# Patient Record
Sex: MALE | Race: WHITE | Employment: FULL TIME | URBAN - METROPOLITAN AREA
[De-identification: names, ages, dates, MRNs, and addresses within clinical notes are randomized per-mention and may not be internally consistent; named-entity substitution may affect disease eponyms.]

---

## 2019-01-08 ENCOUNTER — OFFICE VISIT (OUTPATIENT)
Dept: INTERNAL MEDICINE | Facility: CLINIC | Age: 54
End: 2019-01-08
Payer: COMMERCIAL

## 2019-01-08 VITALS
OXYGEN SATURATION: 100 % | WEIGHT: 195 LBS | BODY MASS INDEX: 27.3 KG/M2 | HEART RATE: 96 BPM | RESPIRATION RATE: 18 BRPM | TEMPERATURE: 98.1 F | SYSTOLIC BLOOD PRESSURE: 130 MMHG | HEIGHT: 71 IN | DIASTOLIC BLOOD PRESSURE: 68 MMHG

## 2019-01-08 DIAGNOSIS — Z00.00 ROUTINE GENERAL MEDICAL EXAMINATION AT A HEALTH CARE FACILITY: Primary | ICD-10-CM

## 2019-01-08 DIAGNOSIS — Z12.5 SCREENING PSA (PROSTATE SPECIFIC ANTIGEN): ICD-10-CM

## 2019-01-08 DIAGNOSIS — E78.5 HYPERLIPIDEMIA LDL GOAL <130: ICD-10-CM

## 2019-01-08 LAB
ALBUMIN SERPL-MCNC: 4.2 G/DL (ref 3.4–5)
ALP SERPL-CCNC: 72 U/L (ref 40–150)
ALT SERPL W P-5'-P-CCNC: 34 U/L (ref 0–70)
ANION GAP SERPL CALCULATED.3IONS-SCNC: 8 MMOL/L (ref 3–14)
AST SERPL W P-5'-P-CCNC: 21 U/L (ref 0–45)
BILIRUB SERPL-MCNC: 1.4 MG/DL (ref 0.2–1.3)
BUN SERPL-MCNC: 15 MG/DL (ref 7–30)
CALCIUM SERPL-MCNC: 9.1 MG/DL (ref 8.5–10.1)
CHLORIDE SERPL-SCNC: 105 MMOL/L (ref 94–109)
CHOLEST SERPL-MCNC: 257 MG/DL
CO2 SERPL-SCNC: 27 MMOL/L (ref 20–32)
CREAT SERPL-MCNC: 1.14 MG/DL (ref 0.66–1.25)
ERYTHROCYTE [DISTWIDTH] IN BLOOD BY AUTOMATED COUNT: 13.9 % (ref 10–15)
GFR SERPL CREATININE-BSD FRML MDRD: 73 ML/MIN/{1.73_M2}
GLUCOSE SERPL-MCNC: 97 MG/DL (ref 70–99)
HCT VFR BLD AUTO: 43.9 % (ref 40–53)
HDLC SERPL-MCNC: 48 MG/DL
HGB BLD-MCNC: 14.4 G/DL (ref 13.3–17.7)
LDLC SERPL CALC-MCNC: 186 MG/DL
MCH RBC QN AUTO: 28.9 PG (ref 26.5–33)
MCHC RBC AUTO-ENTMCNC: 32.8 G/DL (ref 31.5–36.5)
MCV RBC AUTO: 88 FL (ref 78–100)
NONHDLC SERPL-MCNC: 209 MG/DL
PLATELET # BLD AUTO: 231 10E9/L (ref 150–450)
POTASSIUM SERPL-SCNC: 3.9 MMOL/L (ref 3.4–5.3)
PROT SERPL-MCNC: 7.5 G/DL (ref 6.8–8.8)
PSA SERPL-ACNC: 0.99 UG/L (ref 0–4)
RBC # BLD AUTO: 4.99 10E12/L (ref 4.4–5.9)
SODIUM SERPL-SCNC: 140 MMOL/L (ref 133–144)
TRIGL SERPL-MCNC: 116 MG/DL
TSH SERPL DL<=0.005 MIU/L-ACNC: 2.11 MU/L (ref 0.4–4)
WBC # BLD AUTO: 6 10E9/L (ref 4–11)

## 2019-01-08 PROCEDURE — 99396 PREV VISIT EST AGE 40-64: CPT | Performed by: INTERNAL MEDICINE

## 2019-01-08 PROCEDURE — 85027 COMPLETE CBC AUTOMATED: CPT | Performed by: INTERNAL MEDICINE

## 2019-01-08 PROCEDURE — 80053 COMPREHEN METABOLIC PANEL: CPT | Performed by: INTERNAL MEDICINE

## 2019-01-08 PROCEDURE — G0103 PSA SCREENING: HCPCS | Performed by: INTERNAL MEDICINE

## 2019-01-08 PROCEDURE — 84443 ASSAY THYROID STIM HORMONE: CPT | Performed by: INTERNAL MEDICINE

## 2019-01-08 PROCEDURE — 80061 LIPID PANEL: CPT | Performed by: INTERNAL MEDICINE

## 2019-01-08 PROCEDURE — 36415 COLL VENOUS BLD VENIPUNCTURE: CPT | Performed by: INTERNAL MEDICINE

## 2019-01-08 SDOH — HEALTH STABILITY: MENTAL HEALTH
STRESS IS WHEN SOMEONE FEELS TENSE, NERVOUS, ANXIOUS, OR CAN'T SLEEP AT NIGHT BECAUSE THEIR MIND IS TROUBLED. HOW STRESSED ARE YOU?: NOT AT ALL

## 2019-01-08 SDOH — ECONOMIC STABILITY: INCOME INSECURITY: HOW HARD IS IT FOR YOU TO PAY FOR THE VERY BASICS LIKE FOOD, HOUSING, MEDICAL CARE, AND HEATING?: NOT HARD AT ALL

## 2019-01-08 SDOH — ECONOMIC STABILITY: FOOD INSECURITY: WITHIN THE PAST 12 MONTHS, THE FOOD YOU BOUGHT JUST DIDN'T LAST AND YOU DIDN'T HAVE MONEY TO GET MORE.: NEVER TRUE

## 2019-01-08 SDOH — SOCIAL STABILITY: SOCIAL NETWORK: ARE YOU MARRIED, WIDOWED, DIVORCED, SEPARATED, NEVER MARRIED, OR LIVING WITH A PARTNER?: MARRIED

## 2019-01-08 SDOH — ECONOMIC STABILITY: TRANSPORTATION INSECURITY
IN THE PAST 12 MONTHS, HAS LACK OF TRANSPORTATION KEPT YOU FROM MEETINGS, WORK, OR FROM GETTING THINGS NEEDED FOR DAILY LIVING?: NO

## 2019-01-08 SDOH — HEALTH STABILITY: PHYSICAL HEALTH: ON AVERAGE, HOW MANY DAYS PER WEEK DO YOU ENGAGE IN MODERATE TO STRENUOUS EXERCISE (LIKE A BRISK WALK)?: 5 DAYS

## 2019-01-08 SDOH — ECONOMIC STABILITY: FOOD INSECURITY: WITHIN THE PAST 12 MONTHS, YOU WORRIED THAT YOUR FOOD WOULD RUN OUT BEFORE YOU GOT MONEY TO BUY MORE.: NEVER TRUE

## 2019-01-08 SDOH — HEALTH STABILITY: MENTAL HEALTH: HOW OFTEN DO YOU HAVE 6 OR MORE DRINKS ON ONE OCCASION?: NEVER

## 2019-01-08 SDOH — ECONOMIC STABILITY: TRANSPORTATION INSECURITY
IN THE PAST 12 MONTHS, HAS THE LACK OF TRANSPORTATION KEPT YOU FROM MEDICAL APPOINTMENTS OR FROM GETTING MEDICATIONS?: NO

## 2019-01-08 SDOH — SOCIAL STABILITY: SOCIAL INSECURITY: WITHIN THE LAST YEAR, HAVE YOU BEEN AFRAID OF YOUR PARTNER OR EX-PARTNER?: NO

## 2019-01-08 SDOH — SOCIAL STABILITY: SOCIAL INSECURITY
WITHIN THE LAST YEAR, HAVE YOU BEEN KICKED, HIT, SLAPPED, OR OTHERWISE PHYSICALLY HURT BY YOUR PARTNER OR EX-PARTNER?: NO

## 2019-01-08 SDOH — SOCIAL STABILITY: SOCIAL INSECURITY
WITHIN THE LAST YEAR, HAVE TO BEEN RAPED OR FORCED TO HAVE ANY KIND OF SEXUAL ACTIVITY BY YOUR PARTNER OR EX-PARTNER?: NO

## 2019-01-08 SDOH — HEALTH STABILITY: MENTAL HEALTH: HOW OFTEN DO YOU HAVE A DRINK CONTAINING ALCOHOL?: 2-4 TIMES A MONTH

## 2019-01-08 SDOH — SOCIAL STABILITY: SOCIAL INSECURITY: WITHIN THE LAST YEAR, HAVE YOU BEEN HUMILIATED OR EMOTIONALLY ABUSED IN OTHER WAYS BY YOUR PARTNER OR EX-PARTNER?: NO

## 2019-01-08 SDOH — HEALTH STABILITY: PHYSICAL HEALTH: ON AVERAGE, HOW MANY MINUTES DO YOU ENGAGE IN EXERCISE AT THIS LEVEL?: 60 MIN

## 2019-01-08 SDOH — HEALTH STABILITY: MENTAL HEALTH: HOW MANY STANDARD DRINKS CONTAINING ALCOHOL DO YOU HAVE ON A TYPICAL DAY?: 1 OR 2

## 2019-01-08 ASSESSMENT — ENCOUNTER SYMPTOMS
SHORTNESS OF BREATH: 0
HEADACHES: 0
COUGH: 0
FREQUENCY: 0
NAUSEA: 0
DYSURIA: 0
CHILLS: 0
DIZZINESS: 0
HEMATURIA: 0
ARTHRALGIAS: 0
CONSTIPATION: 0
WEAKNESS: 0
FEVER: 0
PARESTHESIAS: 0
PALPITATIONS: 0
MYALGIAS: 0
SORE THROAT: 0
EYE PAIN: 0
ABDOMINAL PAIN: 0
JOINT SWELLING: 0
NERVOUS/ANXIOUS: 0
DIARRHEA: 0
HEARTBURN: 0
HEMATOCHEZIA: 0

## 2019-01-08 ASSESSMENT — MIFFLIN-ST. JEOR: SCORE: 1745.76

## 2019-01-08 NOTE — PROGRESS NOTES
SUBJECTIVE:   CC: Kory Ambrose is an 53 year old male who presents for preventive health visit.     Healthy Habits:  Answers for HPI/ROS submitted by the patient on 1/8/2019   Annual Exam:  Frequency of exercise:: 4-5 days/week  Getting at least 3 servings of Calcium per day:: Yes  Diet:: Regular (no restrictions)  Taking medications regularly:: Yes  Medication side effects:: None  Bi-annual eye exam:: Yes  Dental care twice a year:: Yes  Sleep apnea or symptoms of sleep apnea:: None  Negative for the following: abdominal pain, Blood in stool, Blood in urine, chest pain, chills, congestion, constipation, cough, diarrhea, dizziness, ear pain, eye pain, nervous/anxious, fever, frequency, genital sores, headaches, hearing loss, heartburn, arthralgias, joint swelling, peripheral edema, mood changes, myalgias, nausea, dysuria, palpitations, Skin sensation changes, sore throat, urgency, rash, shortness of breath, visual disturbance, weakness  impotence: No  penile discharge: No  Additional concerns today:: No  Duration of exercise:: 30-45 minutes    Weight Loss   Patient notes that he intestinally lost weight by having a regular exercise routine and doing portion control. Mentions that he works out five days a week at the [employer] gym. Reports that he participated in the meta fast diet five years ago.     Wt Readings from Last 2 Encounters:   01/08/19 88.5 kg (195 lb)   10/12/16 88.9 kg (196 lb)     HDL  Recent Labs   Lab Test 08/11/16  0733 08/19/15  0930 07/11/14  0848   CHOL 143 120 136   HDL 50 51 42   LDL 78 57 84   TRIG 77 58 45   CHOLHDLRATIO  --  2.4 3.2     Pt discontinued his Simvastatin medication for about a year now.     Past/recent records reviewed and discussed for:  -Reviewed and updated medical hx, social hx, and family hx,  - A family history of cancer, notes that his sister passed away from ovarian cancer.  - Pt had a colonoscopy screening on 12/02/2015    Today's PHQ-2 Score:   PHQ-2 ( 1999 [employer])  1/8/2019 8/11/2016   Q1: Little interest or pleasure in doing things 0 0   Q2: Feeling down, depressed or hopeless 0 0   PHQ-2 Score 0 0   Q1: Little interest or pleasure in doing things Not at all -   Q2: Feeling down, depressed or hopeless Not at all -   PHQ-2 Score 0 -       Abuse: Current or Past(Physical, Sexual or Emotional)- No  Do you feel safe in your environment? Yes    Social History     Tobacco Use     Smoking status: Never Smoker     Smokeless tobacco: Never Used   Substance Use Topics     Alcohol use: Yes     Alcohol/week: 0.0 oz     Frequency: 2-4 times a month     Drinks per session: 1 or 2     Binge frequency: Never     Comment: One beer per week, occasional glass of wine     If you drink alcohol do you typically have >3 drinks per day or >7 drinks per week? No                      Last PSA:   PSA   Date Value Ref Range Status   08/11/2016 0.73 0 - 4 ug/L Final     Comment:     Assay Method:  Chemiluminescence using Siemens Vista analyzer       Reviewed orders with patient. Reviewed health maintenance and updated orders accordingly - Yes      Reviewed and updated as needed this visit by clinical staff  Tobacco  Allergies  Meds  Med Hx  Surg Hx  Fam Hx  Soc Hx        Reviewed and updated as needed this visit by Provider            ROS:  CONSTITUTIONAL: NEGATIVE for fever, chills, change in weight  INTEGUMENTARY/SKIN: NEGATIVE for worrisome rashes, moles or lesions  EYES: NEGATIVE for vision changes or irritation  ENT: NEGATIVE for ear, mouth and throat problems  RESP: NEGATIVE for significant cough or SOB  CV: NEGATIVE for chest pain, palpitations or peripheral edema  GI: NEGATIVE for nausea, abdominal pain, heartburn, or change in bowel habits   male: negative for dysuria, hematuria, decreased urinary stream, erectile dysfunction, urethral discharge  MUSCULOSKELETAL: NEGATIVE for significant arthralgias or myalgia  NEURO: NEGATIVE for weakness, dizziness or paresthesias  PSYCHIATRIC:  "NEGATIVE for changes in mood or affect    This document serves as a record of the services and decisions personally performed and made by Isaac Mallory MD. It was created on his behalf by Shannan Baliey, a trained medical scribe. The creation of this document is based on the provider's statements to the medical scribe.  Shannan Bailey January 8, 2019 7:12 AM      OBJECTIVE:   /68 (BP Location: Left arm, Patient Position: Sitting, Cuff Size: Adult Large)   Pulse 96   Temp 98.1  F (36.7  C) (Oral)   Resp 18   Ht 1.794 m (5' 10.63\")   Wt 88.5 kg (195 lb)   SpO2 100%   BMI 27.48 kg/m     EXAM:  GENERAL: healthy, alert and no distress  EYES: Eyes grossly normal to inspection, PERRL and conjunctivae and sclerae normal  HENT: ear canals and TM's normal, nose and mouth without ulcers or lesions  NECK: no adenopathy, no asymmetry, masses, or scars and thyroid normal to palpation  RESP: lungs clear to auscultation - no rales, rhonchi or wheezes  CV: regular rate and rhythm, normal S1 S2, no S3 or S4, no murmur, click or rub, no peripheral edema and peripheral pulses strong  ABDOMEN: soft, nontender, no hepatosplenomegaly, no masses and bowel sounds normal   (male): normal male genitalia without lesions or urethral discharge, no hernia  RECTAL: normal sphincter tone, no rectal masses, prostate normal size, smooth, nontender without nodules or masses  MS: no gross musculoskeletal defects noted, no edema  SKIN: no suspicious lesions or rashes  NEURO: Normal strength and tone, mentation intact and speech normal  PSYCH: mentation appears normal, affect normal/bright    Diagnostic Test Results:  No results found for this or any previous visit (from the past 24 hour(s)).    ASSESSMENT/PLAN:   (Z00.00) Routine general medical examination at a health care facility  (primary encounter diagnosis)  Comment: Stable health. See epic orders.  Plan: Comprehensive metabolic panel, Lipid panel         reflex to direct LDL Fasting, TSH " "with free T4         reflex, CBC with platelets, Prostate spec         antigen screen            (Z12.5) Screening PSA (prostate specific antigen)  Comment: Labs pending.  Plan: Prostate spec antigen screen            (E78.5) Hyperlipidemia LDL goal <130  Comment: Labs pending.  Plan: Comprehensive metabolic panel, Lipid panel         reflex to direct LDL Fasting          Everything looks fine!    I'll get back to you with lab results soon, especially if there is anything of concern.      See you in a year, sooner if problems.      COUNSELING:  Reviewed preventive health counseling, as reflected in patient instructions       Regular exercise       Healthy diet/nutrition       Vision screening       Colon cancer screening       Prostate cancer screening    BP Readings from Last 1 Encounters:   01/08/19 130/68     Estimated body mass index is 27.48 kg/m  as calculated from the following:    Height as of this encounter: 1.794 m (5' 10.63\").    Weight as of this encounter: 88.5 kg (195 lb).    BP Screening:   Last 3 BP Readings:    BP Readings from Last 3 Encounters:   01/08/19 130/68   10/12/16 121/78   10/05/16 118/68       The following was recommended to the patient:  Re-screen BP within a year and recommended lifestyle modifications  Weight management plan: Discussed healthy diet and exercise guidelines     reports that  has never smoked. he has never used smokeless tobacco.      Counseling Resources:  ATP IV Guidelines  Pooled Cohorts Equation Calculator  FRAX Risk Assessment  ICSI Preventive Guidelines  Dietary Guidelines for Americans, 2010  USDA's MyPlate  ASA Prophylaxis  Lung CA Screening    The information in this document, created by the medical scribe for me, accurately reflects the services I personally performed and the decisions made by me. I have reviewed and approved this document for accuracy.   January 8, 2019 8:03 AM     Isaac Mallory MD, MD  [facility]  "

## 2019-01-08 NOTE — PATIENT INSTRUCTIONS
Preventive Health Recommendations  Male Ages 50 - 64    Yearly exam:             See your health care provider every year in order to  o   Review health changes.   o   Discuss preventive care.    o   Review your medicines if your doctor has prescribed any.     Have a cholesterol test every 5 years, or more frequently if you are at risk for high cholesterol/heart disease.     Have a diabetes test (fasting glucose) every three years. If you are at risk for diabetes, you should have this test more often.     Have a colonoscopy at age 50, or have a yearly FIT test (stool test). These exams will check for colon cancer.      Talk with your health care provider about whether or not a prostate cancer screening test (PSA) is right for you.    You should be tested each year for STDs (sexually transmitted diseases), if you re at risk.     Shots: Get a flu shot each year. Get a tetanus shot every 10 years.     Nutrition:    Eat at least 5 servings of fruits and vegetables daily.     Eat whole-grain bread, whole-wheat pasta and brown rice instead of white grains and rice.     Get adequate Calcium and Vitamin D.     Lifestyle    Exercise for at least 150 minutes a week (30 minutes a day, 5 days a week). This will help you control your weight and prevent disease.     Limit alcohol to one drink per day.     No smoking.     Wear sunscreen to prevent skin cancer.     See your dentist every six months for an exam and cleaning.     See your eye doctor every 1 to 2 years.      Everything looks fine!    I'll get back to you with lab results soon, especially if there is anything of concern.      See you in a year, sooner if problems.

## 2019-07-09 ENCOUNTER — OFFICE VISIT (OUTPATIENT)
Dept: INTERNAL MEDICINE | Facility: CLINIC | Age: 54
End: 2019-07-09
Payer: COMMERCIAL

## 2019-07-09 VITALS
BODY MASS INDEX: 27.33 KG/M2 | WEIGHT: 195.2 LBS | TEMPERATURE: 98.1 F | SYSTOLIC BLOOD PRESSURE: 138 MMHG | HEIGHT: 71 IN | RESPIRATION RATE: 16 BRPM | DIASTOLIC BLOOD PRESSURE: 79 MMHG | HEART RATE: 85 BPM | OXYGEN SATURATION: 100 %

## 2019-07-09 DIAGNOSIS — R07.9 CHEST PAIN, UNSPECIFIED TYPE: Primary | ICD-10-CM

## 2019-07-09 PROCEDURE — 93000 ELECTROCARDIOGRAM COMPLETE: CPT | Performed by: FAMILY MEDICINE

## 2019-07-09 PROCEDURE — 99214 OFFICE O/P EST MOD 30 MIN: CPT | Performed by: FAMILY MEDICINE

## 2019-07-09 ASSESSMENT — MIFFLIN-ST. JEOR: SCORE: 1749.85

## 2019-07-09 ASSESSMENT — PAIN SCALES - GENERAL: PAINLEVEL: MILD PAIN (3)

## 2019-07-09 NOTE — PROGRESS NOTES
"  CHIEF COMPLAINT    Chest discomfort  Heart beating hard.      HISTORY    This patient comes in noting symptoms over the last 3 to 4 weeks and would like to get things checked out.  He has had some mild chest discomfort, sometimes on the left sometimes on the right.  This is not necessarily related to exertion.  He has at times felt like his heart is beating more strongly or pounding but is not racing and has remained with a normal heart rate.    Patient has a history of hyperlipidemia with recent .  Patient has a positive family history of heart disease in his father.    He is a non-smoker.  He does not have diabetes or hypertension.    Patient also has no history of GERD.      Patient Active Problem List   Diagnosis     HYPERLIPIDEMIA LDL GOAL <130     Overweight (BMI 25.0-29.9)     Current Outpatient Medications   Medication Sig Dispense Refill     aspirin 81 MG tablet Take 1 tablet (81 mg) by mouth daily 30 tablet 0     multivitamin, therapeutic with minerals (MULTI-VITAMIN) TABS Take 1 tablet by mouth daily       Omega-3 Fatty Acids (OMEGA-3 FISH OIL PO) Take 1 g by mouth daily       ranitidine (ZANTAC) 150 MG tablet Take 1 tablet (150 mg) by mouth 2 times daily 60 tablet 1       REVIEW OF SYSTEMS    No fevers.  No cough or S OB.  No nausea or abdominal pain.  No extremity pain.  No edema.  No unusual weakness.  No rashes.      Past Medical History:   Diagnosis Date     Calculus of kidney     Two prior episodes able to pass on own     Motion sickness     spinning rides pnly     Other and unspecified hyperlipidemia      Other chronic pain     knee       EXAM  /79 (BP Location: Right arm, Patient Position: Sitting, Cuff Size: Adult Large)   Pulse 85   Temp 98.1  F (36.7  C) (Oral)   Resp 16   Ht 1.799 m (5' 10.83\")   Wt 88.5 kg (195 lb 3.2 oz)   SpO2 100%   BMI 27.36 kg/m      Exam shows him to appear well in general.  HEENT unremarkable.  Neck no thyromegaly.  Chest wall nontender, lungs " clear.  Cardiac RSR, no murmurs or rubs.  Abdomen nontender.  Extremities no edema.      EKG:  NSR, rate 71.  Conduction normal.  Axis normal.  ST segments and T waves normal.  No significant Q waves, no ectopy.  EKG is within normal limits.  No previous for comparison.      (R07.9) Chest pain, unspecified type  (primary encounter diagnosis)  Comment:     Symptoms not classic for ischemia.  Risk factors of family history and cholesterol are noted.  Also consider GERD/heartburn symptoms.  Patient is agreeable to have some further consultation.    Plan: EKG 12-lead complete w/read - Clinics,         ranitidine (ZANTAC) 150 MG tablet, CARDIOLOGY         EVAL ADULT REFERRAL

## 2019-07-15 PROBLEM — R07.89 ATYPICAL CHEST PAIN: Status: ACTIVE | Noted: 2019-07-15

## 2019-07-16 ENCOUNTER — OFFICE VISIT (OUTPATIENT)
Dept: CARDIOLOGY | Facility: CLINIC | Age: 54
End: 2019-07-16
Attending: FAMILY MEDICINE
Payer: COMMERCIAL

## 2019-07-16 VITALS
HEIGHT: 70 IN | HEART RATE: 78 BPM | WEIGHT: 195.8 LBS | BODY MASS INDEX: 28.03 KG/M2 | SYSTOLIC BLOOD PRESSURE: 147 MMHG | DIASTOLIC BLOOD PRESSURE: 82 MMHG

## 2019-07-16 DIAGNOSIS — I25.9 CHEST PAIN DUE TO MYOCARDIAL ISCHEMIA, UNSPECIFIED ISCHEMIC CHEST PAIN TYPE: ICD-10-CM

## 2019-07-16 DIAGNOSIS — R07.9 CHEST PAIN, UNSPECIFIED TYPE: ICD-10-CM

## 2019-07-16 DIAGNOSIS — E78.5 HYPERLIPIDEMIA LDL GOAL <130: Primary | ICD-10-CM

## 2019-07-16 PROCEDURE — 99203 OFFICE O/P NEW LOW 30 MIN: CPT | Performed by: INTERNAL MEDICINE

## 2019-07-16 RX ORDER — METOPROLOL TARTRATE 50 MG
50 TABLET ORAL PRN
Qty: 2 TABLET | Refills: 0 | Status: SHIPPED | OUTPATIENT
Start: 2019-07-16 | End: 2019-07-26

## 2019-07-16 ASSESSMENT — MIFFLIN-ST. JEOR: SCORE: 1739.39

## 2019-07-16 NOTE — LETTER
7/16/2019    Isaac Mallory MD, MD  [address] [facility] [address]  [facility] [address]    RE: Kory Ambrose       Dear Colleague,    I had the pleasure of seeing Kory Ambrose in the [facility].      HPI and Plan:     I had the pleasure of seeing Kory Ambrose, 53-year-old pleasant male in cardiology clinic for evaluation of his chest pain.    Papo is brother of my former nurse practitioner, Yoanna Stratton.  He has no prior coronary artery disease history.  He denies history of hypertension or diabetes.  His blood pressure usually in the 130s systolic.  He is a non-smoker.    Over the last few weeks, he has had intermittent chest discomfort which he describes as dull ache in the left precordial region the last for seconds to minute and radiates to the left shoulder and sometimes to the right shoulder.  It is not always with exertion.  It is not associate with any nausea vomiting or jaw pain.    He does have a strong family history of coronary artery disease.  His father had microinfarction while getting therapy for cancer in his 50s.  His uncle also had coronary artery disease in his 50s.  His grandfather's have also CAD.    He has significant hyperlipidemia.  His LDL was 186 and HDL 48 in January of this year.  The triglycerides were normal.  He used to be on simvastatin 2 years ago but not anymore.  His primary care discontinued it apparently because it was thought that he was low risk.    He had an EKG done earlier this month which showed sinus rhythm with borderline left atrial enlargement.    He had an echocardiogram in 2013 which was essentially normal.    He works as a  for an insurance company    Physical exam  See below    Impression  Chest pain, risk factors of strong family history of CAD, borderline elevated blood pressures and significant hyperlipidemia  At this time, his chest pain has some atypical features although his chest pain with Vicryl ischemia is not  entirely excluded.  He has at least intermediate pre test probability of significant CAD.  Therefore, I would recommend f further evaluation to rule out severe CAD.  We discussed possibility of doing a CT coronary angiography versus stress imaging.  The pros and cons of each approach were discussed.  I would favor CT coronary angiography as it will rule out significant CAD as well as assess for early atherosclerosis follow-up as risk stratify him further and start risk factor modification.    Borderline hypertension versus elevated blood pressure without formal diagnosis of hypertension  Will have to check blood pressures at home.  I will get an echocardiogram to make sure there is no endorgan damage or diastolic dysfunction.    Hyperlipidemia  Depending on the results of CT coronary angiography, we may have a low threshold to start statin.    He is on baby aspirin which she will continue.    He will return to see my nurse practitioner in follow-up after the above test.    Thank you for allowing us to participate in the care of this nice patient    Sincerely,    Herberth Taylor MD        Orders Placed This Encounter   Procedures     CT Angiogram coronary artery     Follow-Up with Cardiac Advanced Practice Provider     Echocardiogram Complete       Orders Placed This Encounter   Medications     metoprolol tartrate (LOPRESSOR) 50 MG tablet     Sig: Take 1 tablet (50 mg) by mouth as needed     Dispense:  2 tablet     Refill:  0       There are no discontinued medications.      Encounter Diagnoses   Name Primary?     Hyperlipidemia LDL goal <130 Yes     Chest pain, unspecified type      Chest pain due to myocardial ischemia, unspecified ischemic chest pain type        CURRENT MEDICATIONS:  Current Outpatient Medications   Medication Sig Dispense Refill     aspirin 81 MG tablet Take 1 tablet (81 mg) by mouth daily 30 tablet 0     metoprolol tartrate (LOPRESSOR) 50 MG tablet Take 1 tablet (50 mg) by mouth as needed 2 tablet  0     multivitamin, therapeutic with minerals (MULTI-VITAMIN) TABS Take 1 tablet by mouth daily       Omega-3 Fatty Acids (OMEGA-3 FISH OIL PO) Take 1 g by mouth daily       ranitidine (ZANTAC) 150 MG tablet Take 1 tablet (150 mg) by mouth 2 times daily 60 tablet 1       ALLERGIES     Allergies   Allergen Reactions     Penicillins Rash       PAST MEDICAL HISTORY:  Past Medical History:   Diagnosis Date     Calculus of kidney     Two prior episodes able to pass on own     Motion sickness     spinning rides pnly     Other and unspecified hyperlipidemia      Other chronic pain     knee       PAST SURGICAL HISTORY:  Past Surgical History:   Procedure Laterality Date     COLONOSCOPY N/A 2015    Diffuse diverticulosis, no polyps. Procedure: COLONOSCOPY;  Surgeon: Shayne Haskins MD;  Location: [facility]     HERNIORRHAPHY UMBILICAL N/A 10/12/2016    Procedure: HERNIORRHAPHY UMBILICAL;  Surgeon: Abad Jaffe MD;  Location: [facility] OR     NO HISTORY OF SURGERY       ORTHOPEDIC SURGERY  2013    middle finger in left hand nerve re-attachment        FAMILY HISTORY:  Family History   Problem Relation Age of Onset     Diabetes Father      Cancer Father         Lung CA with brain mets     Heart Disease Father          age 55, heart attack     Breast Cancer Mother         Born 1936. Also HTN     Anesthesia Reaction Mother      Diabetes Sister         Had kidney failure, s/p kidney and pancreas transplant     Cancer Sister          age 54, ovarian cancer     Family History Negative Sister         Born 1963     Family History Negative Brother         Born 1959, has had adenomatous polyps     Family History Negative Brother         Born 1967     Colon Cancer Maternal Grandmother        SOCIAL HISTORY:  Social History     Socioeconomic History     Marital status:      Spouse name: Marva     Number of children: 2     Years of education: None     Highest education level: Bachelor's degree (e.g., BA, AB, BS)    Occupational History     Occupation: Sales/     Employer: [employer]    Social Needs     Financial resource strain: Not hard at all     Food insecurity:     Worry: Never true     Inability: Never true     Transportation needs:     Medical: No     Non-medical: No   Tobacco Use     Smoking status: Never Smoker     Smokeless tobacco: Never Used   Substance and Sexual Activity     Alcohol use: Yes     Alcohol/week: 0.0 oz     Frequency: 2-4 times a month     Drinks per session: 1 or 2     Binge frequency: Never     Comment: One beer per week, occasional glass of wine     Drug use: No     Sexual activity: Yes     Partners: Female   Lifestyle     Physical activity:     Days per week: 5 days     Minutes per session: 60 min     Stress: Not at all   Relationships     Social connections:     Talks on phone: None     Gets together: None     Attends Denominational service: None     Active member of club or organization: None     Attends meetings of clubs or organizations: None     Relationship status:      Intimate partner violence:     Fear of current or ex partner: No     Emotionally abused: No     Physically abused: No     Forced sexual activity: No   Other Topics Concern     Parent/sibling w/ CABG, MI or angioplasty before 65F 55M? Yes   Social History Narrative    35 minutes cardiovascular and 20 minutes weights, 5 times weekly.     Two children, one in [facility], one in [place]. Two grandchildren.        Review of Systems:  Skin:  Negative       Eyes:  Positive for contacts    ENT:  Negative      Respiratory:  Negative       Cardiovascular:  Negative;edema;syncope or near-syncope;cyanosis;exercise intolerance;fatigue;dizziness Positive for;palpitations;lightheadedness chest discomfort  Gastroenterology: Negative      Genitourinary:  Negative      Musculoskeletal:  Positive for joint pain    Neurologic:  Negative      Psychiatric:  Negative      Heme/Lymph/Imm:  Negative      Endocrine:  Negative    "     Physical Exam:  Vitals: /82 (BP Location: Left arm, Cuff Size: Adult Large)   Pulse 78   Ht 1.778 m (5' 10\")   Wt 88.8 kg (195 lb 12.8 oz)   BMI 28.09 kg/m       Constitutional:  in no acute distress        Skin:  warm and dry to the touch          Head:  normocephalic        Eyes:  sclera white        Lymph:      ENT:  no pallor or cyanosis        Neck:  JVP normal        Respiratory:  clear to auscultation         Cardiac: regular rhythm;normal S1 and S2     no presence of murmur                                                   GI:  not assessed this visit        Extremities and Muscular Skeletal:  no edema              Neurological:  no gross motor deficits        Psych:  Alert and Oriented x 3        CC  Maximus Leon MD  [address] [place] [address]                Thank you for allowing me to participate in the care of your patient.      Sincerely,     Herberth Taylor MD     [facility]    cc:   Maximus Leon MD  [address] [place] [address]        "

## 2019-07-16 NOTE — PROGRESS NOTES
HPI and Plan:     I had the pleasure of seeing Kory Ambrose, 53-year-old pleasant male in cardiology clinic for evaluation of his chest pain.    Papo is brother of my former nurse practitioner, Yoanna Stratton.  He has no prior coronary artery disease history.  He denies history of hypertension or diabetes.  His blood pressure usually in the 130s systolic.  He is a non-smoker.    Over the last few weeks, he has had intermittent chest discomfort which he describes as dull ache in the left precordial region the last for seconds to minute and radiates to the left shoulder and sometimes to the right shoulder.  It is not always with exertion.  It is not associate with any nausea vomiting or jaw pain.    He does have a strong family history of coronary artery disease.  His father had microinfarction while getting therapy for cancer in his 50s.  His uncle also had coronary artery disease in his 50s.  His grandfather's have also CAD.    He has significant hyperlipidemia.  His LDL was 186 and HDL 48 in January of this year.  The triglycerides were normal.  He used to be on simvastatin 2 years ago but not anymore.  His primary care discontinued it apparently because it was thought that he was low risk.    He had an EKG done earlier this month which showed sinus rhythm with borderline left atrial enlargement.    He had an echocardiogram in 2013 which was essentially normal.    He works as a  for an insurance company    Physical exam  See below    Impression  Chest pain, risk factors of strong family history of CAD, borderline elevated blood pressures and significant hyperlipidemia  At this time, his chest pain has some atypical features although his chest pain with Vicryl ischemia is not entirely excluded.  He has at least intermediate pre test probability of significant CAD.  Therefore, I would recommend f further evaluation to rule out severe CAD.  We discussed possibility of doing a CT coronary angiography  versus stress imaging.  The pros and cons of each approach were discussed.  I would favor CT coronary angiography as it will rule out significant CAD as well as assess for early atherosclerosis follow-up as risk stratify him further and start risk factor modification.    Borderline hypertension versus elevated blood pressure without formal diagnosis of hypertension  Will have to check blood pressures at home.  I will get an echocardiogram to make sure there is no endorgan damage or diastolic dysfunction.    Hyperlipidemia  Depending on the results of CT coronary angiography, we may have a low threshold to start statin.    He is on baby aspirin which she will continue.    He will return to see my nurse practitioner in follow-up after the above test.    Thank you for allowing us to participate in the care of this nice patient    Sincerely,    Herberth Taylor MD        Orders Placed This Encounter   Procedures     CT Angiogram coronary artery     Follow-Up with Cardiac Advanced Practice Provider     Echocardiogram Complete       Orders Placed This Encounter   Medications     metoprolol tartrate (LOPRESSOR) 50 MG tablet     Sig: Take 1 tablet (50 mg) by mouth as needed     Dispense:  2 tablet     Refill:  0       There are no discontinued medications.      Encounter Diagnoses   Name Primary?     Hyperlipidemia LDL goal <130 Yes     Chest pain, unspecified type      Chest pain due to myocardial ischemia, unspecified ischemic chest pain type        CURRENT MEDICATIONS:  Current Outpatient Medications   Medication Sig Dispense Refill     aspirin 81 MG tablet Take 1 tablet (81 mg) by mouth daily 30 tablet 0     metoprolol tartrate (LOPRESSOR) 50 MG tablet Take 1 tablet (50 mg) by mouth as needed 2 tablet 0     multivitamin, therapeutic with minerals (MULTI-VITAMIN) TABS Take 1 tablet by mouth daily       Omega-3 Fatty Acids (OMEGA-3 FISH OIL PO) Take 1 g by mouth daily       ranitidine (ZANTAC) 150 MG tablet Take 1 tablet (150  mg) by mouth 2 times daily 60 tablet 1       ALLERGIES     Allergies   Allergen Reactions     Penicillins Rash       PAST MEDICAL HISTORY:  Past Medical History:   Diagnosis Date     Calculus of kidney     Two prior episodes able to pass on own     Motion sickness     spinning rides pnly     Other and unspecified hyperlipidemia      Other chronic pain     knee       PAST SURGICAL HISTORY:  Past Surgical History:   Procedure Laterality Date     COLONOSCOPY N/A 2015    Diffuse diverticulosis, no polyps. Procedure: COLONOSCOPY;  Surgeon: Shayne Haskins MD;  Location: [facility]     HERNIORRHAPHY UMBILICAL N/A 10/12/2016    Procedure: HERNIORRHAPHY UMBILICAL;  Surgeon: Abad Jaffe MD;  Location: [facility]     NO HISTORY OF SURGERY       ORTHOPEDIC SURGERY  2013    middle finger in left hand nerve re-attachment        FAMILY HISTORY:  Family History   Problem Relation Age of Onset     Diabetes Father      Cancer Father         Lung CA with brain mets     Heart Disease Father          age 55, heart attack     Breast Cancer Mother         Born 1936. Also HTN     Anesthesia Reaction Mother      Diabetes Sister         Had kidney failure, s/p kidney and pancreas transplant     Cancer Sister          age 54, ovarian cancer     Family History Negative Sister         Born 1963     Family History Negative Brother         Born 9, has had adenomatous polyps     Family History Negative Brother         Born 1967     Colon Cancer Maternal Grandmother        SOCIAL HISTORY:  Social History     Socioeconomic History     Marital status:      Spouse name: Marva     Number of children: 2     Years of education: None     Highest education level: Bachelor's degree (e.g., BA, AB, BS)   Occupational History     Occupation: Sales/     Employer: [employer]    Social Needs     Financial resource strain: Not hard at all     Food insecurity:     Worry: Never true     Inability: Never true      "Transportation needs:     Medical: No     Non-medical: No   Tobacco Use     Smoking status: Never Smoker     Smokeless tobacco: Never Used   Substance and Sexual Activity     Alcohol use: Yes     Alcohol/week: 0.0 oz     Frequency: 2-4 times a month     Drinks per session: 1 or 2     Binge frequency: Never     Comment: One beer per week, occasional glass of wine     Drug use: No     Sexual activity: Yes     Partners: Female   Lifestyle     Physical activity:     Days per week: 5 days     Minutes per session: 60 min     Stress: Not at all   Relationships     Social connections:     Talks on phone: None     Gets together: None     Attends Caodaism service: None     Active member of club or organization: None     Attends meetings of clubs or organizations: None     Relationship status:      Intimate partner violence:     Fear of current or ex partner: No     Emotionally abused: No     Physically abused: No     Forced sexual activity: No   Other Topics Concern     Parent/sibling w/ CABG, MI or angioplasty before 65F 55M? Yes   Social History Narrative    35 minutes cardiovascular and 20 minutes weights, 5 times weekly.     Two children, one in [facility], one in [place]. Two grandchildren.        Review of Systems:  Skin:  Negative       Eyes:  Positive for contacts    ENT:  Negative      Respiratory:  Negative       Cardiovascular:  Negative;edema;syncope or near-syncope;cyanosis;exercise intolerance;fatigue;dizziness Positive for;palpitations;lightheadedness chest discomfort  Gastroenterology: Negative      Genitourinary:  Negative      Musculoskeletal:  Positive for joint pain    Neurologic:  Negative      Psychiatric:  Negative      Heme/Lymph/Imm:  Negative      Endocrine:  Negative        Physical Exam:  Vitals: /82 (BP Location: Left arm, Cuff Size: Adult Large)   Pulse 78   Ht 1.778 m (5' 10\")   Wt 88.8 kg (195 lb 12.8 oz)   BMI 28.09 kg/m      Constitutional:  in no acute distress    "     Skin:  warm and dry to the touch          Head:  normocephalic        Eyes:  sclera white        Lymph:      ENT:  no pallor or cyanosis        Neck:  JVP normal        Respiratory:  clear to auscultation         Cardiac: regular rhythm;normal S1 and S2     no presence of murmur                                                   GI:  not assessed this visit        Extremities and Muscular Skeletal:  no edema              Neurological:  no gross motor deficits        Psych:  Alert and Oriented x 3        CC  Maximus Leon MD  [address] [place] [address]

## 2019-07-16 NOTE — LETTER
7/16/2019    Isaac Mallory MD, MD  [address] [facility] [address]  [place] [address]    RE: Kory Ambrose       Dear Colleague,    I had the pleasure of seeing Kory Ambrose in the [facility].      HPI and Plan:     I had the pleasure of seeing Kory Ambrose, 53-year-old pleasant male in cardiology clinic for evaluation of his chest pain.    Papo is brother of my former nurse practitioner, Yoanna Stratton.  He has no prior coronary artery disease history.  He denies history of hypertension or diabetes.  His blood pressure usually in the 130s systolic.  He is a non-smoker.    Over the last few weeks, he has had intermittent chest discomfort which he describes as dull ache in the left precordial region the last for seconds to minute and radiates to the left shoulder and sometimes to the right shoulder.  It is not always with exertion.  It is not associate with any nausea vomiting or jaw pain.    He does have a strong family history of coronary artery disease.  His father had microinfarction while getting therapy for cancer in his 50s.  His uncle also had coronary artery disease in his 50s.  His grandfather's have also CAD.    He has significant hyperlipidemia.  His LDL was 186 and HDL 48 in January of this year.  The triglycerides were normal.  He used to be on simvastatin 2 years ago but not anymore.  His primary care discontinued it apparently because it was thought that he was low risk.    He had an EKG done earlier this month which showed sinus rhythm with borderline left atrial enlargement.    He had an echocardiogram in 2013 which was essentially normal.    He works as a  for an insurance company    Physical exam  See below    Impression  Chest pain, risk factors of strong family history of CAD, borderline elevated blood pressures and significant hyperlipidemia  At this time, his chest pain has some atypical features although his chest pain with Vicryl ischemia is not  entirely excluded.  He has at least intermediate pre test probability of significant CAD.  Therefore, I would recommend f further evaluation to rule out severe CAD.  We discussed possibility of doing a CT coronary angiography versus stress imaging.  The pros and cons of each approach were discussed.  I would favor CT coronary angiography as it will rule out significant CAD as well as assess for early atherosclerosis follow-up as risk stratify him further and start risk factor modification.    Borderline hypertension versus elevated blood pressure without formal diagnosis of hypertension  Will have to check blood pressures at home.  I will get an echocardiogram to make sure there is no endorgan damage or diastolic dysfunction.    Hyperlipidemia  Depending on the results of CT coronary angiography, we may have a low threshold to start statin.    He is on baby aspirin which she will continue.    He will return to see my nurse practitioner in follow-up after the above test.    Thank you for allowing us to participate in the care of this nice patient    Sincerely,    Herberth Taylor MD        Orders Placed This Encounter   Procedures     CT Angiogram coronary artery     Follow-Up with Cardiac Advanced Practice Provider     Echocardiogram Complete       Orders Placed This Encounter   Medications     metoprolol tartrate (LOPRESSOR) 50 MG tablet     Sig: Take 1 tablet (50 mg) by mouth as needed     Dispense:  2 tablet     Refill:  0       There are no discontinued medications.      Encounter Diagnoses   Name Primary?     Hyperlipidemia LDL goal <130 Yes     Chest pain, unspecified type      Chest pain due to myocardial ischemia, unspecified ischemic chest pain type        CURRENT MEDICATIONS:  Current Outpatient Medications   Medication Sig Dispense Refill     aspirin 81 MG tablet Take 1 tablet (81 mg) by mouth daily 30 tablet 0     metoprolol tartrate (LOPRESSOR) 50 MG tablet Take 1 tablet (50 mg) by mouth as needed 2 tablet  0     multivitamin, therapeutic with minerals (MULTI-VITAMIN) TABS Take 1 tablet by mouth daily       Omega-3 Fatty Acids (OMEGA-3 FISH OIL PO) Take 1 g by mouth daily       ranitidine (ZANTAC) 150 MG tablet Take 1 tablet (150 mg) by mouth 2 times daily 60 tablet 1       ALLERGIES     Allergies   Allergen Reactions     Penicillins Rash       PAST MEDICAL HISTORY:  Past Medical History:   Diagnosis Date     Calculus of kidney     Two prior episodes able to pass on own     Motion sickness     spinning rides pnly     Other and unspecified hyperlipidemia      Other chronic pain     knee       PAST SURGICAL HISTORY:  Past Surgical History:   Procedure Laterality Date     COLONOSCOPY N/A 2015    Diffuse diverticulosis, no polyps. Procedure: COLONOSCOPY;  Surgeon: Shayne Haskins MD;  Location: [facility]     HERNIORRHAPHY UMBILICAL N/A 10/12/2016    Procedure: HERNIORRHAPHY UMBILICAL;  Surgeon: Abad Jaffe MD;  Location: [facility]     NO HISTORY OF SURGERY       ORTHOPEDIC SURGERY  2013    middle finger in left hand nerve re-attachment        FAMILY HISTORY:  Family History   Problem Relation Age of Onset     Diabetes Father      Cancer Father         Lung CA with brain mets     Heart Disease Father          age 55, heart attack     Breast Cancer Mother         Born 1936. Also HTN     Anesthesia Reaction Mother      Diabetes Sister         Had kidney failure, s/p kidney and pancreas transplant     Cancer Sister          age 54, ovarian cancer     Family History Negative Sister         Born 1963     Family History Negative Brother         Born 1959, has had adenomatous polyps     Family History Negative Brother         Born 1967     Colon Cancer Maternal Grandmother        SOCIAL HISTORY:  Social History     Socioeconomic History     Marital status:      Spouse name: Marva     Number of children: 2     Years of education: None     Highest education level: Bachelor's degree (e.g., BA, AB, BS)    Occupational History     Occupation: Sales/     Employer: [employer]    Social Needs     Financial resource strain: Not hard at all     Food insecurity:     Worry: Never true     Inability: Never true     Transportation needs:     Medical: No     Non-medical: No   Tobacco Use     Smoking status: Never Smoker     Smokeless tobacco: Never Used   Substance and Sexual Activity     Alcohol use: Yes     Alcohol/week: 0.0 oz     Frequency: 2-4 times a month     Drinks per session: 1 or 2     Binge frequency: Never     Comment: One beer per week, occasional glass of wine     Drug use: No     Sexual activity: Yes     Partners: Female   Lifestyle     Physical activity:     Days per week: 5 days     Minutes per session: 60 min     Stress: Not at all   Relationships     Social connections:     Talks on phone: None     Gets together: None     Attends Druze service: None     Active member of club or organization: None     Attends meetings of clubs or organizations: None     Relationship status:      Intimate partner violence:     Fear of current or ex partner: No     Emotionally abused: No     Physically abused: No     Forced sexual activity: No   Other Topics Concern     Parent/sibling w/ CABG, MI or angioplasty before 65F 55M? Yes   Social History Narrative    35 minutes cardiovascular and 20 minutes weights, 5 times weekly.     Two children, one in [facility], one in [place]. Two grandchildren.        Review of Systems:  Skin:  Negative       Eyes:  Positive for contacts    ENT:  Negative      Respiratory:  Negative       Cardiovascular:  Negative;edema;syncope or near-syncope;cyanosis;exercise intolerance;fatigue;dizziness Positive for;palpitations;lightheadedness chest discomfort  Gastroenterology: Negative      Genitourinary:  Negative      Musculoskeletal:  Positive for joint pain    Neurologic:  Negative      Psychiatric:  Negative      Heme/Lymph/Imm:  Negative      Endocrine:  Negative    "     Physical Exam:  Vitals: /82 (BP Location: Left arm, Cuff Size: Adult Large)   Pulse 78   Ht 1.778 m (5' 10\")   Wt 88.8 kg (195 lb 12.8 oz)   BMI 28.09 kg/m       Constitutional:  in no acute distress        Skin:  warm and dry to the touch          Head:  normocephalic        Eyes:  sclera white        Lymph:      ENT:  no pallor or cyanosis        Neck:  JVP normal        Respiratory:  clear to auscultation         Cardiac: regular rhythm;normal S1 and S2     no presence of murmur                                                   GI:  not assessed this visit        Extremities and Muscular Skeletal:  no edema              Neurological:  no gross motor deficits        Psych:  Alert and Oriented x 3              Thank you for allowing me to participate in the care of your patient.    Sincerely,     Herberth Taylor MD     [facility]    cc:   Maximus Leon MD  [address] [place] [address]      "

## 2019-07-25 ENCOUNTER — HOSPITAL ENCOUNTER (OUTPATIENT)
Dept: CARDIOLOGY | Facility: CLINIC | Age: 54
Discharge: HOME | End: 2019-07-25
Attending: INTERNAL MEDICINE | Admitting: INTERNAL MEDICINE
Payer: COMMERCIAL

## 2019-07-25 ENCOUNTER — TELEPHONE (OUTPATIENT)
Dept: CARDIOLOGY | Facility: CLINIC | Age: 54
End: 2019-07-25

## 2019-07-25 VITALS — HEART RATE: 60 BPM | DIASTOLIC BLOOD PRESSURE: 66 MMHG | SYSTOLIC BLOOD PRESSURE: 112 MMHG

## 2019-07-25 DIAGNOSIS — I25.9 CHEST PAIN DUE TO MYOCARDIAL ISCHEMIA, UNSPECIFIED ISCHEMIC CHEST PAIN TYPE: ICD-10-CM

## 2019-07-25 DIAGNOSIS — E78.5 HYPERLIPIDEMIA LDL GOAL <130: ICD-10-CM

## 2019-07-25 DIAGNOSIS — R07.9 CHEST PAIN: Primary | ICD-10-CM

## 2019-07-25 PROCEDURE — 25000125 ZZHC RX 250: Performed by: INTERNAL MEDICINE

## 2019-07-25 PROCEDURE — 25000128 H RX IP 250 OP 636: Performed by: INTERNAL MEDICINE

## 2019-07-25 PROCEDURE — 25000132 ZZH RX MED GY IP 250 OP 250 PS 637: Performed by: INTERNAL MEDICINE

## 2019-07-25 PROCEDURE — 93306 TTE W/DOPPLER COMPLETE: CPT | Mod: 26 | Performed by: INTERNAL MEDICINE

## 2019-07-25 PROCEDURE — 75574 CT ANGIO HRT W/3D IMAGE: CPT | Mod: 26 | Performed by: INTERNAL MEDICINE

## 2019-07-25 PROCEDURE — 25500064 ZZH RX 255 OP 636: Performed by: INTERNAL MEDICINE

## 2019-07-25 PROCEDURE — 40000264 ECHOCARDIOGRAM COMPLETE

## 2019-07-25 PROCEDURE — 75574 CT ANGIO HRT W/3D IMAGE: CPT

## 2019-07-25 RX ORDER — METOPROLOL TARTRATE 50 MG
50-100 TABLET ORAL
Status: DISCONTINUED | OUTPATIENT
Start: 2019-07-25 | End: 2019-07-26 | Stop reason: HOSPADM

## 2019-07-25 RX ORDER — DIPHENHYDRAMINE HYDROCHLORIDE 50 MG/ML
25-50 INJECTION INTRAMUSCULAR; INTRAVENOUS
Status: DISCONTINUED | OUTPATIENT
Start: 2019-07-25 | End: 2019-07-26 | Stop reason: HOSPADM

## 2019-07-25 RX ORDER — METOPROLOL TARTRATE 1 MG/ML
5-15 INJECTION, SOLUTION INTRAVENOUS
Status: DISCONTINUED | OUTPATIENT
Start: 2019-07-25 | End: 2019-07-26 | Stop reason: HOSPADM

## 2019-07-25 RX ORDER — IOPAMIDOL 755 MG/ML
500 INJECTION, SOLUTION INTRAVASCULAR ONCE
Status: COMPLETED | OUTPATIENT
Start: 2019-07-25 | End: 2019-07-25

## 2019-07-25 RX ORDER — DIPHENHYDRAMINE HCL 25 MG
25 CAPSULE ORAL
Status: DISCONTINUED | OUTPATIENT
Start: 2019-07-25 | End: 2019-07-26 | Stop reason: HOSPADM

## 2019-07-25 RX ORDER — ACYCLOVIR 200 MG/1
0-1 CAPSULE ORAL
Status: DISCONTINUED | OUTPATIENT
Start: 2019-07-25 | End: 2019-07-26 | Stop reason: HOSPADM

## 2019-07-25 RX ORDER — ONDANSETRON 2 MG/ML
4 INJECTION INTRAMUSCULAR; INTRAVENOUS
Status: DISCONTINUED | OUTPATIENT
Start: 2019-07-25 | End: 2019-07-26 | Stop reason: HOSPADM

## 2019-07-25 RX ORDER — NITROGLYCERIN 0.4 MG/1
0.4 TABLET SUBLINGUAL
Status: DISCONTINUED | OUTPATIENT
Start: 2019-07-25 | End: 2019-07-26 | Stop reason: HOSPADM

## 2019-07-25 RX ORDER — METHYLPREDNISOLONE SODIUM SUCCINATE 125 MG/2ML
125 INJECTION, POWDER, LYOPHILIZED, FOR SOLUTION INTRAMUSCULAR; INTRAVENOUS
Status: DISCONTINUED | OUTPATIENT
Start: 2019-07-25 | End: 2019-07-26 | Stop reason: HOSPADM

## 2019-07-25 RX ADMIN — METOPROLOL TARTRATE 5 MG: 5 INJECTION INTRAVENOUS at 09:00

## 2019-07-25 RX ADMIN — SODIUM CHLORIDE 100 ML: 9 INJECTION, SOLUTION INTRAVENOUS at 09:07

## 2019-07-25 RX ADMIN — METOPROLOL TARTRATE 5 MG: 5 INJECTION INTRAVENOUS at 08:57

## 2019-07-25 RX ADMIN — IOPAMIDOL 120 ML: 755 INJECTION, SOLUTION INTRAVENOUS at 09:07

## 2019-07-25 RX ADMIN — HUMAN ALBUMIN MICROSPHERES AND PERFLUTREN 9 ML: 10; .22 INJECTION, SOLUTION INTRAVENOUS at 08:28

## 2019-07-25 RX ADMIN — NITROGLYCERIN 0.4 MG: 0.4 TABLET SUBLINGUAL at 08:52

## 2019-07-25 NOTE — TELEPHONE ENCOUNTER
lung fields and bases are incompletely imaged and  therefore not fully evaluated. The visualized lung fields are clear.  No pleural effusion. Partially imaged hypodense hepatic lesion in the  anterior left hepatic lobe measures at least 1.3 cm. Visualized upper  abdominal structures are otherwise unremarkable within limitations of  contrast bolus timing.    Recommend right upper quadrant/hepatic ultrasound for further  Evaluation.  Sees NP 7/31/19. JAYY Ibanez RN

## 2019-07-26 ENCOUNTER — TELEPHONE (OUTPATIENT)
Dept: INTERNAL MEDICINE | Facility: CLINIC | Age: 54
End: 2019-07-26

## 2019-07-26 DIAGNOSIS — R16.0 LIVER MASS, LEFT LOBE: Primary | ICD-10-CM

## 2019-07-26 NOTE — TELEPHONE ENCOUNTER
Pt calling requesting right upper quadrant hepatic ultrasound be ordered by Dr. Mallory. He stated that this was suggested by radiology after testing done yesterday 7/25/19 through cardiology. Pt can be reached at [phone]. Please advise. Thanks.

## 2019-07-26 NOTE — TELEPHONE ENCOUNTER
RN called patient and left VM advising patient to call back to review Dr. Taylor's recommendations for obtaining stress ECHO.         ----- Message from Herberth Taylor MD sent at 7/25/2019  4:49 PM CDT -----  CTA results noted. PDA not well seen. Recommend stress echo to make sure no inferior ischemia. Given radiology recommendations, will need abdominal ultrasound to assess liver. Please order that and also forward that to the primary care physician and see if they want to order that and follow it.  Given very high LDL< would still recommend statin. Can discuss with NP.

## 2019-07-26 NOTE — TELEPHONE ENCOUNTER
Patient returned call and RN reviewed with him Dr. Taylor's recommendations. Patient in agreement to proceed with stress ECHO and has already contacted his PMD regarding his abdominal imaging. RN placed orders for stress ECHO. Patient had no further questions at this time. RN transferred patient to scheduling to arrange stress ECHO. Patient will call with any further questions/concerns.

## 2019-07-30 ENCOUNTER — HOSPITAL ENCOUNTER (OUTPATIENT)
Dept: CARDIOLOGY | Facility: CLINIC | Age: 54
Discharge: HOME | End: 2019-07-30
Attending: INTERNAL MEDICINE | Admitting: INTERNAL MEDICINE
Payer: COMMERCIAL

## 2019-07-30 DIAGNOSIS — R07.9 CHEST PAIN: ICD-10-CM

## 2019-07-30 PROCEDURE — 93018 CV STRESS TEST I&R ONLY: CPT | Performed by: INTERNAL MEDICINE

## 2019-07-30 PROCEDURE — 25500064 ZZH RX 255 OP 636: Performed by: INTERNAL MEDICINE

## 2019-07-30 PROCEDURE — 93325 DOPPLER ECHO COLOR FLOW MAPG: CPT | Mod: 26 | Performed by: INTERNAL MEDICINE

## 2019-07-30 PROCEDURE — 40000264 ECHO STRESS ECHOCARDIOGRAM

## 2019-07-30 PROCEDURE — 93321 DOPPLER ECHO F-UP/LMTD STD: CPT | Mod: 26 | Performed by: INTERNAL MEDICINE

## 2019-07-30 PROCEDURE — 93350 STRESS TTE ONLY: CPT | Mod: 26 | Performed by: INTERNAL MEDICINE

## 2019-07-30 PROCEDURE — 93016 CV STRESS TEST SUPVJ ONLY: CPT | Performed by: INTERNAL MEDICINE

## 2019-07-30 RX ADMIN — HUMAN ALBUMIN MICROSPHERES AND PERFLUTREN 4 ML: 10; .22 INJECTION, SOLUTION INTRAVENOUS at 09:27

## 2019-07-31 ENCOUNTER — OFFICE VISIT (OUTPATIENT)
Dept: CARDIOLOGY | Facility: CLINIC | Age: 54
End: 2019-07-31
Attending: INTERNAL MEDICINE
Payer: COMMERCIAL

## 2019-07-31 ENCOUNTER — HOSPITAL ENCOUNTER (OUTPATIENT)
Dept: ULTRASOUND IMAGING | Facility: CLINIC | Age: 54
Discharge: HOME | End: 2019-07-31
Attending: INTERNAL MEDICINE | Admitting: INTERNAL MEDICINE
Payer: COMMERCIAL

## 2019-07-31 VITALS
HEART RATE: 86 BPM | WEIGHT: 195.9 LBS | DIASTOLIC BLOOD PRESSURE: 82 MMHG | SYSTOLIC BLOOD PRESSURE: 120 MMHG | BODY MASS INDEX: 28.11 KG/M2

## 2019-07-31 DIAGNOSIS — E78.5 HYPERLIPIDEMIA LDL GOAL <130: ICD-10-CM

## 2019-07-31 DIAGNOSIS — I20.89 OTHER FORMS OF ANGINA PECTORIS (H): Primary | ICD-10-CM

## 2019-07-31 DIAGNOSIS — R16.0 LIVER MASS, LEFT LOBE: ICD-10-CM

## 2019-07-31 PROCEDURE — 76705 ECHO EXAM OF ABDOMEN: CPT

## 2019-07-31 PROCEDURE — 99214 OFFICE O/P EST MOD 30 MIN: CPT | Performed by: NURSE PRACTITIONER

## 2019-07-31 RX ORDER — ROSUVASTATIN CALCIUM 20 MG/1
20 TABLET, COATED ORAL DAILY
Qty: 90 TABLET | Refills: 0 | Status: SHIPPED | OUTPATIENT
Start: 2019-07-31 | End: 2019-11-06

## 2019-07-31 NOTE — PROGRESS NOTES
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 He was referred in recently for pectoral chest discomfort, mostly non-exertional.    He has a family history of coronary artery disease with a father who developed an MI during cancer therapy in his 50s.  His uncle has coronary artery disease with an onset in his 50s.  Both grandfathers have coronary artery disease.  Papo has dyslipidemia, used to take simvastatin.  His LDL was in the 50's at one point and he actually was down to 175 pounds at that point/on simvastatin.  He has been off statin now and his recent LDL was 186. He has gained back about 15 pounds.     He changed his weightlifting protocol about 3 months ago.  He has noticed some pectoral discomfort that is a pressure and at times a sharp discomfort from the left pectoral muscle towards the shoulder and occasionally this happens on the right side.  Given his risk factors and symptoms, Dr. Taylor sent him for cardiac testing involving a stress echocardiogram, CT coronary angiogram, and subsequent stress echo.  He returns today to review those results.      Resting echocardiogram shows LV function at 50% with borderline right ventricular enlargement.  The left atrium was measured at 4.0 cm with a left atrial volume of 32.2ml/m2 which is within normal limits.  A CT angio noted a calcium score of 0 with no plaque.  The posterior descending on the left side was not visualized well.  Therefore, evaluation of the inferior, inferoseptal walls for ischemia was suggested.  This prompted a stress echocardiogram where the patient exercised for 13 minutes on a Raheem protocol, reaching 99% of his maximum predicted heart rate.  Blood pressure was reasonably controlled prior to and after exertion with a normal blood pressure response to exercise.  His LV function prior to exercise was  estimated at 55%-60% and augmented to 65%-70% with no evidence of ischemia and no chest pain during exertion.      There was an incidental finding on a CT angiogram of a lesion in the liver, etiology unclear.  An ultrasound was suggested.  He called his primary care physician and an ultrasound of the liver was done this morning, identifying a 1.4 x 1.5 x 1.2 cm cyst in the anterior liver, probably in the medial segment left lobe with partial thin septation which would correspond to the CT findings.  The liver was otherwise normal and there was a polyp in the gallbladder.      He takes aspirin at the suggestion of his sister who is a nurse practitioner.  He likely has no indication currently for aspirin given his CTA results.  His chest discomfort has resolved now for the past 5 weeks.      We had a long discussion about his cholesterol profile.  Given his family history, we would suggest statin therapy with a goal LDL of around 100 or less.  I will put him on Crestor 20 mg daily and he will see his primary care physician for labs, follow-up, and dose adjustments.     IMPRESSION AND PLAN:   1.  Atypical chest discomfort, typically nonexertional.  This occurred on the heels of changing his weightlifting routine.  He has no calcium on his CT calcium score and no plaque identified.  The left PDA was not well visualized, but the stress echo was normal.  We will continue aggressive risk factor modification.   -stop Aspirin  2.  I will have Dr. Taylor review the left atrial size (volume is normal) and the echo measuring LV function at 50% because at stress echo the LV function was normal at rest and was normal on echo in 2013.  I will contact the patient once I have Dr. Taylor's opinion about that.   3.  Dyslipidemia.  Begin Crestor 20 mg daily.  Our goal LDL would be 100 or below.  He will have a lipid profile with an ALT done at his primary care physician in 2 months.   4.  Likely liver cyst.  I did share those results with  the patient today and he will contact his primary care physician for further discussion.  We will plan to see him back on a p.r.n. basis.      It has been a pleasure meeting Papo in followup today.  We certainly are available at any point should he need to come back.  Greater than 50% of this 30-minute visit today was spent in counseling and collaboration.  This plan was reviewed with Dr. Taylor.         PEDRO PALAFOX NP             D: 2019   T: 2019   MT: RILEY      Name:     JEANNETTE KO   MRN:      [id number]        Account:      [id number]   :      1965           Service Date: 2019      Document: [id number]

## 2019-07-31 NOTE — LETTER
7/31/2019    Isaac Mallory MD, MD  [address]  [facility] [address]    RE: Kory Curryon       Dear Colleague,    I had the pleasure of seeing Kory Ambrose in the [facility].    Chest pain  Dull ache  Nonsmoker  FH CAD--dad MI during cancer therapy in his 50's  Uncle cad in his 50's  Grandfathers have cad  Dyslipidemia--used to take simvastatin  steco negative  Hepatic lesion  cta calcium zero  No disease    History of Present Illness:    [id number]         Impression/Plan:     It has been a pleasure seeing Kory Ambrose in follow up    Zeb Tinoco, MSN, APRN-BC, CNP  Cardiology    No orders of the defined types were placed in this encounter.    Orders Placed This Encounter   Medications     rosuvastatin (CRESTOR) 20 MG tablet     Sig: Take 1 tablet (20 mg) by mouth daily     Dispense:  90 tablet     Refill:  0     Medications Discontinued During This Encounter   Medication Reason     aspirin 81 MG tablet          Encounter Diagnosis   Name Primary?     Hyperlipidemia LDL goal <130        CURRENT MEDICATIONS:  Current Outpatient Medications   Medication Sig Dispense Refill     multivitamin, therapeutic with minerals (MULTI-VITAMIN) TABS Take 1 tablet by mouth daily       Omega-3 Fatty Acids (OMEGA-3 FISH OIL PO) Take 1 g by mouth daily       ranitidine (ZANTAC) 150 MG tablet Take 1 tablet (150 mg) by mouth 2 times daily 60 tablet 1     rosuvastatin (CRESTOR) 20 MG tablet Take 1 tablet (20 mg) by mouth daily 90 tablet 0       ALLERGIES     Allergies   Allergen Reactions     Penicillins Rash       PAST MEDICAL HISTORY:  Past Medical History:   Diagnosis Date     Calculus of kidney     Two prior episodes able to pass on own     Motion sickness     spinning rides pnly     Other and unspecified hyperlipidemia      Other chronic pain     knee       PAST SURGICAL HISTORY:  Past Surgical History:   Procedure Laterality Date     COLONOSCOPY N/A 12/2/2015    Diffuse  diverticulosis, no polyps. Procedure: COLONOSCOPY;  Surgeon: Shayne Haskins MD;  Location: [facility]     HERNIORRHAPHY UMBILICAL N/A 10/12/2016    Procedure: HERNIORRHAPHY UMBILICAL;  Surgeon: Abad Jaffe MD;  Location: [facility] OR     NO HISTORY OF SURGERY       ORTHOPEDIC SURGERY  2013    middle finger in left hand nerve re-attachment        FAMILY HISTORY:  Family History   Problem Relation Age of Onset     Diabetes Father      Cancer Father         Lung CA with brain mets     Heart Disease Father          age 55, heart attack     Breast Cancer Mother         Born 1936. Also HTN     Anesthesia Reaction Mother      Diabetes Sister         Had kidney failure, s/p kidney and pancreas transplant     Cancer Sister          age 54, ovarian cancer     Family History Negative Sister         Born 1963     Family History Negative Brother         Born , has had adenomatous polyps     Family History Negative Brother         Born      Colon Cancer Maternal Grandmother        SOCIAL HISTORY:  Social History     Socioeconomic History     Marital status:      Spouse name: Marva     Number of children: 2     Years of education: None     Highest education level: Bachelor's degree (e.g., BA, AB, BS)   Occupational History     Occupation: Sales/     Employer: [employer]    Social Needs     Financial resource strain: Not hard at all     Food insecurity:     Worry: Never true     Inability: Never true     Transportation needs:     Medical: No     Non-medical: No   Tobacco Use     Smoking status: Never Smoker     Smokeless tobacco: Never Used   Substance and Sexual Activity     Alcohol use: Yes     Alcohol/week: 0.0 oz     Frequency: 2-4 times a month     Drinks per session: 1 or 2     Binge frequency: Never     Comment: One beer per week, occasional glass of wine     Drug use: No     Sexual activity: Yes     Partners: Female   Lifestyle     Physical activity:     Days per week: 5 days      Minutes per session: 60 min     Stress: Not at all   Relationships     Social connections:     Talks on phone: None     Gets together: None     Attends Latter day service: None     Active member of club or organization: None     Attends meetings of clubs or organizations: None     Relationship status:      Intimate partner violence:     Fear of current or ex partner: No     Emotionally abused: No     Physically abused: No     Forced sexual activity: No   Other Topics Concern     Parent/sibling w/ CABG, MI or angioplasty before 65F 55M? Yes   Social History Narrative    35 minutes cardiovascular and 20 minutes weights, 5 times weekly.     Two children, one in [facility], one in [place]. Two grandchildren.        Review of Systems:  Skin:  Negative       Eyes:  Positive for contacts    ENT:  Negative      Respiratory:  Negative       Cardiovascular:  Negative;edema;syncope or near-syncope;cyanosis;exercise intolerance;fatigue;dizziness Positive for;lightheadedness;chest pain chest discomforton left upper side of chest and sometimes radiates to the right  Gastroenterology: Negative      Genitourinary:  Negative      Musculoskeletal:  Positive for joint pain    Neurologic:  Negative      Psychiatric:  Negative      Heme/Lymph/Imm:  Negative      Endocrine:  Negative        Physical Exam:  Vitals: /82   Pulse 86   Wt 88.9 kg (195 lb 14.4 oz)   BMI 28.11 kg/m       Constitutional:           Skin:             Head:           Eyes:           Lymph:      ENT:           Neck:           Respiratory:            Cardiac:                                                           GI:           Extremities and Muscular Skeletal:                 Neurological:           Psych:         Recent Lab Results:  LIPID RESULTS:  Lab Results   Component Value Date    CHOL 257 (H) 01/08/2019    HDL 48 01/08/2019     (H) 01/08/2019    TRIG 116 01/08/2019    CHOLHDLRATIO 2.4 08/19/2015       LIVER ENZYME RESULTS:  Lab  Results   Component Value Date    AST 21 01/08/2019    ALT 34 01/08/2019       CBC RESULTS:  Lab Results   Component Value Date    WBC 6.0 01/08/2019    RBC 4.99 01/08/2019    HGB 14.4 01/08/2019    HCT 43.9 01/08/2019    MCV 88 01/08/2019    MCH 28.9 01/08/2019    MCHC 32.8 01/08/2019    RDW 13.9 01/08/2019     01/08/2019       BMP RESULTS:  Lab Results   Component Value Date     01/08/2019    POTASSIUM 3.9 01/08/2019    CHLORIDE 105 01/08/2019    CO2 27 01/08/2019    ANIONGAP 8 01/08/2019    GLC 97 01/08/2019    BUN 15 01/08/2019    CR 1.14 01/08/2019    GFRESTIMATED 73 01/08/2019    GFRESTBLACK 85 01/08/2019    QUE 9.1 01/08/2019        A1C RESULTS:  No results found for: A1C    INR RESULTS:  No results found for: INR        CC  Herberth Taylor MD  [id number]  [address]  [place] [id number]                  Service Date: 07/31/2019      HISTORY OF PRESENT ILLNESS:      Papo Ambrose is a very pleasant 53-year-old gentleman who is the brother of one of our previous nurse practitioners here. He was referred in recently for pectoral chest discomfort, mostly non-exertional.    He has a family history of coronary artery disease with a father who developed an MI during cancer therapy in his 50s.  His uncle has coronary artery disease with an onset in his 50s.  Both grandfathers have coronary artery disease.  Papo has dyslipidemia, used to take simvastatin.  His LDL was in the 50's at one point and he actually was down to 175 pounds at that point/on simvastatin.  He has been off statin now and his recent LDL was 186. He has gained back about 15 pounds.     He changed his weightlifting protocol about 3 months ago.  He has noticed some pectoral discomfort that is a pressure and at times a sharp discomfort from the left pectoral muscle towards the shoulder and occasionally this happens on the right side.  Given his risk factors and symptoms, Dr. Taylor sent him for cardiac testing involving a stress  echocardiogram, CT coronary angiogram, and subsequent stress echo.  He returns today to review those results.      Resting echocardiogram shows LV function at 50% with borderline right ventricular enlargement.  The left atrium was measured at 4.0 cm with a left atrial volume of 32.2ml/m2 which is within normal limits.  A CT angio noted a calcium score of 0 with no plaque.  The posterior descending on the left side was not visualized well.  Therefore, evaluation of the inferior, inferoseptal walls for ischemia was suggested.  This prompted a stress echocardiogram where the patient exercised for 13 minutes on a Raheem protocol, reaching 99% of his maximum predicted heart rate.  Blood pressure was reasonably controlled prior to and after exertion with a normal blood pressure response to exercise.  His LV function prior to exercise was estimated at 55%-60% and augmented to 65%-70% with no evidence of ischemia and no chest pain during exertion.      There was an incidental finding on a CT angiogram of a lesion in the liver, etiology unclear.  An ultrasound was suggested.  He called his primary care physician and an ultrasound of the liver was done this morning, identifying a 1.4 x 1.5 x 1.2 cm cyst in the anterior liver, probably in the medial segment left lobe with partial thin septation which would correspond to the CT findings.  The liver was otherwise normal and there was a polyp in the gallbladder.      He takes aspirin at the suggestion of his sister who is a nurse practitioner.  He likely has no indication currently for aspirin given his CTA results.  His chest discomfort has resolved now for the past 5 weeks.      We had a long discussion about his cholesterol profile.  Given his family history, we would suggest statin therapy with a goal LDL of around 100 or less.  I will put him on Crestor 20 mg daily and he will see his primary care physician for labs, follow-up, and dose adjustments.     IMPRESSION AND PLAN:    1.  Atypical chest discomfort, typically nonexertional.  This occurred on the heels of changing his weightlifting routine.  He has no calcium on his CT calcium score and no plaque identified.  The left PDA was not well visualized, but the stress echo was normal.  We will continue aggressive risk factor modification.   -stop Aspirin  2.  I will have Dr. Taylor review the left atrial size (volume is normal) and the echo measuring LV function at 50% because at stress echo the LV function was normal at rest and was normal on echo in .  I will contact the patient once I have Dr. Taylor's opinion about that.   3.  Dyslipidemia.  Begin Crestor 20 mg daily.  Our goal LDL would be 100 or below.  He will have a lipid profile with an ALT done at his primary care physician in 2 months.   4.  Likely liver cyst.  I did share those results with the patient today and he will contact his primary care physician for further discussion.  We will plan to see him back on a p.r.n. basis.      It has been a pleasure meeting Papo in followup today.  We certainly are available at any point should he need to come back.  Greater than 50% of this 30-minute visit today was spent in counseling and collaboration.  This plan was reviewed with Dr. Taylor.         PEDRO PALAFOX NP             D: 2019   T: 2019   MT: RILEY      Name:     JEANNETTE KO   MRN:      [id number]        Account:      [id number]   :      1965           Service Date: 2019      Document: [id number]        Thank you for allowing me to participate in the care of your patient.      Sincerely,     JUAN PABLO Peguero [facility]    cc:   Herberth Taylor MD  [address]  [address]  [place] [id number]

## 2019-07-31 NOTE — LETTER
7/31/2019      sIaac Mallory MD, MD  [address] [facility] [address]  [facility] [address]      RE: Kory EDE Ambrose       Dear Colleague,    I had the pleasure of seeing Kory Ambrose in the [facility].    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 He was referred in recently for pectoral chest discomfort, mostly non-exertional.    He has a family history of coronary artery disease with a father who developed an MI during cancer therapy in his 50s.  His uncle has coronary artery disease with an onset in his 50s.  Both grandfathers have coronary artery disease.  Papo has dyslipidemia, used to take simvastatin.  His LDL was in the 50's at one point and he actually was down to 175 pounds at that point/on simvastatin.  He has been off statin now and his recent LDL was 186. He has gained back about 15 pounds.     He changed his weightlifting protocol about 3 months ago.  He has noticed some pectoral discomfort that is a pressure and at times a sharp discomfort from the left pectoral muscle towards the shoulder and occasionally this happens on the right side.  Given his risk factors and symptoms, Dr. Taylor sent him for cardiac testing involving a stress echocardiogram, CT coronary angiogram, and subsequent stress echo.  He returns today to review those results.      Resting echocardiogram shows LV function at 50% with borderline right ventricular enlargement.  The left atrium was measured at 4.0 cm with a left atrial volume of 32.2ml/m2 which is within normal limits.  A CT angio noted a calcium score of 0 with no plaque.  The posterior descending on the left side was not visualized well.  Therefore, evaluation of the inferior, inferoseptal walls for ischemia was suggested.  This prompted a stress echocardiogram where the patient exercised for 13 minutes on a  Raheem protocol, reaching 99% of his maximum predicted heart rate.  Blood pressure was reasonably controlled prior to and after exertion with a normal blood pressure response to exercise.  His LV function prior to exercise was estimated at 55%-60% and augmented to 65%-70% with no evidence of ischemia and no chest pain during exertion.      There was an incidental finding on a CT angiogram of a lesion in the liver, etiology unclear.  An ultrasound was suggested.  He called his primary care physician and an ultrasound of the liver was done this morning, identifying a 1.4 x 1.5 x 1.2 cm cyst in the anterior liver, probably in the medial segment left lobe with partial thin septation which would correspond to the CT findings.  The liver was otherwise normal and there was a polyp in the gallbladder.      He takes aspirin at the suggestion of his sister who is a nurse practitioner.  He likely has no indication currently for aspirin given his CTA results.  His chest discomfort has resolved now for the past 5 weeks.      We had a long discussion about his cholesterol profile.  Given his family history, we would suggest statin therapy with a goal LDL of around 100 or less.  I will put him on Crestor 20 mg daily and he will see his primary care physician for labs, follow-up, and dose adjustments.     IMPRESSION AND PLAN:   1.  Atypical chest discomfort, typically nonexertional.  This occurred on the heels of changing his weightlifting routine.  He has no calcium on his CT calcium score and no plaque identified.  The left PDA was not well visualized, but the stress echo was normal.  We will continue aggressive risk factor modification.   -stop Aspirin  2.  I will have Dr. Taylor review the left atrial size (volume is normal) and the echo measuring LV function at 50% because at stress echo the LV function was normal at rest and was normal on echo in 2013.  I will contact the patient once I have Dr. Taylor's opinion about that.   3.   Dyslipidemia.  Begin Crestor 20 mg daily.  Our goal LDL would be 100 or below.  He will have a lipid profile with an ALT done at his primary care physician in 2 months.   4.  Likely liver cyst.  I did share those results with the patient today and he will contact his primary care physician for further discussion.  We will plan to see him back on a p.r.n. basis.      It has been a pleasure meeting Papo in followup today.  We certainly are available at any point should he need to come back.  Greater than 50% of this 30-minute visit today was spent in counseling and collaboration.  This plan was reviewed with Dr. Taylor.         PEDRO PALAFOX NP             D: 2019   T: 2019   MT: RILEY      Name:     JEANNETTE KO   MRN:      [id number]        Account:      [id number]   :      1965           Service Date: 2019      Document: [id number]           Outpatient Encounter Medications as of 2019   Medication Sig Dispense Refill     multivitamin, therapeutic with minerals (MULTI-VITAMIN) TABS Take 1 tablet by mouth daily       Omega-3 Fatty Acids (OMEGA-3 FISH OIL PO) Take 1 g by mouth daily       ranitidine (ZANTAC) 150 MG tablet Take 1 tablet (150 mg) by mouth 2 times daily 60 tablet 1     rosuvastatin (CRESTOR) 20 MG tablet Take 1 tablet (20 mg) by mouth daily 90 tablet 0     [DISCONTINUED] aspirin 81 MG tablet Take 1 tablet (81 mg) by mouth daily 30 tablet 0     [] perflutren diluted 1mL to 2mL with saline (OPTISON) diluted injection 4 mL        [] sodium chloride (PF) 0.9% PF flush 10 mL        No facility-administered encounter medications on file as of 2019.        Again, thank you for allowing me to participate in the care of your patient.      Sincerely,    JUAN PABLO Peguero [facility]

## 2019-07-31 NOTE — PATIENT INSTRUCTIONS
Stop Aspirin    Start Crestor 20mg daily-any time of the day    Have fasting cholesterol in 2 months at Dr. Mallory    See us back as needed

## 2019-07-31 NOTE — PROGRESS NOTES
Chest pain  Dull ache  Nonsmoker  FH CAD--dad MI during cancer therapy in his 50's  Uncle cad in his 50's  Grandfathers have cad  Dyslipidemia--used to take simvastatin  steco negative  Hepatic lesion  cta calcium zero  No disease    History of Present Illness:    [id number]         Impression/Plan:     It has been a pleasure seeing Kory Ambrose in follow up    Zeb Tinoco, MSN, APRN-BC, CNP  Cardiology    No orders of the defined types were placed in this encounter.    Orders Placed This Encounter   Medications     rosuvastatin (CRESTOR) 20 MG tablet     Sig: Take 1 tablet (20 mg) by mouth daily     Dispense:  90 tablet     Refill:  0     Medications Discontinued During This Encounter   Medication Reason     aspirin 81 MG tablet          Encounter Diagnosis   Name Primary?     Hyperlipidemia LDL goal <130        CURRENT MEDICATIONS:  Current Outpatient Medications   Medication Sig Dispense Refill     multivitamin, therapeutic with minerals (MULTI-VITAMIN) TABS Take 1 tablet by mouth daily       Omega-3 Fatty Acids (OMEGA-3 FISH OIL PO) Take 1 g by mouth daily       ranitidine (ZANTAC) 150 MG tablet Take 1 tablet (150 mg) by mouth 2 times daily 60 tablet 1     rosuvastatin (CRESTOR) 20 MG tablet Take 1 tablet (20 mg) by mouth daily 90 tablet 0       ALLERGIES     Allergies   Allergen Reactions     Penicillins Rash       PAST MEDICAL HISTORY:  Past Medical History:   Diagnosis Date     Calculus of kidney     Two prior episodes able to pass on own     Motion sickness     spinning rides pnly     Other and unspecified hyperlipidemia      Other chronic pain     knee       PAST SURGICAL HISTORY:  Past Surgical History:   Procedure Laterality Date     COLONOSCOPY N/A 12/2/2015    Diffuse diverticulosis, no polyps. Procedure: COLONOSCOPY;  Surgeon: Shayne Haskins MD;  Location:  GI     HERNIORRHAPHY UMBILICAL N/A 10/12/2016    Procedure: HERNIORRHAPHY UMBILICAL;  Surgeon: Abad Jaffe MD;  Location:  [facility] OR     NO HISTORY OF SURGERY       ORTHOPEDIC SURGERY  2013    middle finger in left hand nerve re-attachment        FAMILY HISTORY:  Family History   Problem Relation Age of Onset     Diabetes Father      Cancer Father         Lung CA with brain mets     Heart Disease Father          age 55, heart attack     Breast Cancer Mother         Born 1936. Also HTN     Anesthesia Reaction Mother      Diabetes Sister         Had kidney failure, s/p kidney and pancreas transplant     Cancer Sister          age 54, ovarian cancer     Family History Negative Sister         Born 1963     Family History Negative Brother         Born 1959, has had adenomatous polyps     Family History Negative Brother         Born 1967     Colon Cancer Maternal Grandmother        SOCIAL HISTORY:  Social History     Socioeconomic History     Marital status:      Spouse name: Marva     Number of children: 2     Years of education: None     Highest education level: Bachelor's degree (e.g., BA, AB, BS)   Occupational History     Occupation: Sales/     Employer: [employer]    Social Needs     Financial resource strain: Not hard at all     Food insecurity:     Worry: Never true     Inability: Never true     Transportation needs:     Medical: No     Non-medical: No   Tobacco Use     Smoking status: Never Smoker     Smokeless tobacco: Never Used   Substance and Sexual Activity     Alcohol use: Yes     Alcohol/week: 0.0 oz     Frequency: 2-4 times a month     Drinks per session: 1 or 2     Binge frequency: Never     Comment: One beer per week, occasional glass of wine     Drug use: No     Sexual activity: Yes     Partners: Female   Lifestyle     Physical activity:     Days per week: 5 days     Minutes per session: 60 min     Stress: Not at all   Relationships     Social connections:     Talks on phone: None     Gets together: None     Attends [employer]ist service: None     Active member of club or organization: None      Attends meetings of clubs or organizations: None     Relationship status:      Intimate partner violence:     Fear of current or ex partner: No     Emotionally abused: No     Physically abused: No     Forced sexual activity: No   Other Topics Concern     Parent/sibling w/ CABG, MI or angioplasty before 65F 55M? Yes   Social History Narrative    35 minutes cardiovascular and 20 minutes weights, 5 times weekly.     Two children, one in [facility], one in [place]. Two grandchildren.        Review of Systems:  Skin:  Negative       Eyes:  Positive for contacts    ENT:  Negative      Respiratory:  Negative       Cardiovascular:  Negative;edema;syncope or near-syncope;cyanosis;exercise intolerance;fatigue;dizziness Positive for;lightheadedness;chest pain chest discomforton left upper side of chest and sometimes radiates to the right  Gastroenterology: Negative      Genitourinary:  Negative      Musculoskeletal:  Positive for joint pain    Neurologic:  Negative      Psychiatric:  Negative      Heme/Lymph/Imm:  Negative      Endocrine:  Negative        Physical Exam:  Vitals: /82   Pulse 86   Wt 88.9 kg (195 lb 14.4 oz)   BMI 28.11 kg/m      Constitutional:           Skin:             Head:           Eyes:           Lymph:      ENT:           Neck:           Respiratory:            Cardiac:                                                           GI:           Extremities and Muscular Skeletal:                 Neurological:           Psych:         Recent Lab Results:  LIPID RESULTS:  Lab Results   Component Value Date    CHOL 257 (H) 01/08/2019    HDL 48 01/08/2019     (H) 01/08/2019    TRIG 116 01/08/2019    CHOLHDLRATIO 2.4 08/19/2015       LIVER ENZYME RESULTS:  Lab Results   Component Value Date    AST 21 01/08/2019    ALT 34 01/08/2019       CBC RESULTS:  Lab Results   Component Value Date    WBC 6.0 01/08/2019    RBC 4.99 01/08/2019    HGB 14.4 01/08/2019    HCT 43.9 01/08/2019    MCV 88  01/08/2019    MCH 28.9 01/08/2019    MCHC 32.8 01/08/2019    RDW 13.9 01/08/2019     01/08/2019       BMP RESULTS:  Lab Results   Component Value Date     01/08/2019    POTASSIUM 3.9 01/08/2019    CHLORIDE 105 01/08/2019    CO2 27 01/08/2019    ANIONGAP 8 01/08/2019    GLC 97 01/08/2019    BUN 15 01/08/2019    CR 1.14 01/08/2019    GFRESTIMATED 73 01/08/2019    GFRESTBLACK 85 01/08/2019    QUE 9.1 01/08/2019        A1C RESULTS:  No results found for: A1C    INR RESULTS:  No results found for: INR        CC  Herberth Taylor MD  [address]  [address]  [place] [id number]

## 2019-09-19 ENCOUNTER — MYC MEDICAL ADVICE (OUTPATIENT)
Dept: INTERNAL MEDICINE | Facility: CLINIC | Age: 54
End: 2019-09-19

## 2019-09-19 DIAGNOSIS — E78.5 HYPERLIPIDEMIA LDL GOAL <130: Primary | ICD-10-CM

## 2019-09-20 NOTE — TELEPHONE ENCOUNTER
"Sent patient a [employer] message. He may schedule a future fasting \"lab only\" appointment.   "

## 2019-09-20 NOTE — TELEPHONE ENCOUNTER
Patient sent [employer] message stating Cardiology started him on Rosuvastatin and told him to see Dr. Mallory for follow-up and lab test. Patient asks if he needs to schedule office visit or just lab appointment right now - he has annual physical in January.

## 2019-10-03 ENCOUNTER — HEALTH MAINTENANCE LETTER (OUTPATIENT)
Age: 54
End: 2019-10-03

## 2019-10-31 DIAGNOSIS — E78.5 HYPERLIPIDEMIA LDL GOAL <130: ICD-10-CM

## 2019-10-31 LAB
ALBUMIN SERPL-MCNC: 4 G/DL (ref 3.4–5)
ALP SERPL-CCNC: 72 U/L (ref 40–150)
ALT SERPL W P-5'-P-CCNC: 33 U/L (ref 0–70)
ANION GAP SERPL CALCULATED.3IONS-SCNC: 7 MMOL/L (ref 3–14)
AST SERPL W P-5'-P-CCNC: 17 U/L (ref 0–45)
BILIRUB SERPL-MCNC: 1.4 MG/DL (ref 0.2–1.3)
BUN SERPL-MCNC: 19 MG/DL (ref 7–30)
CALCIUM SERPL-MCNC: 8.8 MG/DL (ref 8.5–10.1)
CHLORIDE SERPL-SCNC: 103 MMOL/L (ref 94–109)
CHOLEST SERPL-MCNC: 105 MG/DL
CO2 SERPL-SCNC: 28 MMOL/L (ref 20–32)
CREAT SERPL-MCNC: 1.15 MG/DL (ref 0.66–1.25)
GFR SERPL CREATININE-BSD FRML MDRD: 72 ML/MIN/{1.73_M2}
GLUCOSE SERPL-MCNC: 102 MG/DL (ref 70–99)
HDLC SERPL-MCNC: 45 MG/DL
LDLC SERPL CALC-MCNC: 48 MG/DL
NONHDLC SERPL-MCNC: 60 MG/DL
POTASSIUM SERPL-SCNC: 4 MMOL/L (ref 3.4–5.3)
PROT SERPL-MCNC: 7.4 G/DL (ref 6.8–8.8)
SODIUM SERPL-SCNC: 138 MMOL/L (ref 133–144)
TRIGL SERPL-MCNC: 61 MG/DL

## 2019-10-31 PROCEDURE — 80053 COMPREHEN METABOLIC PANEL: CPT | Performed by: INTERNAL MEDICINE

## 2019-10-31 PROCEDURE — 80061 LIPID PANEL: CPT | Performed by: INTERNAL MEDICINE

## 2019-10-31 PROCEDURE — 36415 COLL VENOUS BLD VENIPUNCTURE: CPT | Performed by: INTERNAL MEDICINE

## 2019-11-06 ENCOUNTER — MYC REFILL (OUTPATIENT)
Dept: CARDIOLOGY | Facility: CLINIC | Age: 54
End: 2019-11-06

## 2019-11-06 DIAGNOSIS — E78.5 HYPERLIPIDEMIA LDL GOAL <130: ICD-10-CM

## 2019-11-07 RX ORDER — ROSUVASTATIN CALCIUM 20 MG/1
20 TABLET, COATED ORAL DAILY
Qty: 90 TABLET | Refills: 3 | Status: SHIPPED | OUTPATIENT
Start: 2019-11-07 | End: 2020-11-22

## 2020-01-13 ASSESSMENT — ENCOUNTER SYMPTOMS
JOINT SWELLING: 0
NERVOUS/ANXIOUS: 0
DIARRHEA: 0
FREQUENCY: 0
WEAKNESS: 0
MYALGIAS: 0
HEADACHES: 0
DYSURIA: 0
ABDOMINAL PAIN: 0
HEARTBURN: 0
FEVER: 0
SORE THROAT: 0
COUGH: 0
SHORTNESS OF BREATH: 0
DIZZINESS: 0
PALPITATIONS: 0
EYE PAIN: 0
PARESTHESIAS: 0
CHILLS: 0
HEMATOCHEZIA: 0
CONSTIPATION: 0
HEMATURIA: 0
ARTHRALGIAS: 0
NAUSEA: 0

## 2020-01-15 ENCOUNTER — MEDICAL CORRESPONDENCE (OUTPATIENT)
Dept: HEALTH INFORMATION MANAGEMENT | Facility: CLINIC | Age: 55
End: 2020-01-15

## 2020-01-15 ENCOUNTER — OFFICE VISIT (OUTPATIENT)
Dept: INTERNAL MEDICINE | Facility: CLINIC | Age: 55
End: 2020-01-15
Payer: COMMERCIAL

## 2020-01-15 VITALS
BODY MASS INDEX: 25.7 KG/M2 | SYSTOLIC BLOOD PRESSURE: 118 MMHG | RESPIRATION RATE: 18 BRPM | WEIGHT: 179.5 LBS | TEMPERATURE: 97.6 F | OXYGEN SATURATION: 99 % | HEIGHT: 70 IN | DIASTOLIC BLOOD PRESSURE: 70 MMHG | HEART RATE: 79 BPM

## 2020-01-15 DIAGNOSIS — E78.5 HYPERLIPIDEMIA LDL GOAL <130: ICD-10-CM

## 2020-01-15 DIAGNOSIS — Z00.00 ROUTINE GENERAL MEDICAL EXAMINATION AT A HEALTH CARE FACILITY: Primary | ICD-10-CM

## 2020-01-15 DIAGNOSIS — Z79.899 MEDICATION MANAGEMENT: ICD-10-CM

## 2020-01-15 DIAGNOSIS — Z12.5 SCREENING PSA (PROSTATE SPECIFIC ANTIGEN): ICD-10-CM

## 2020-01-15 LAB
ALBUMIN SERPL-MCNC: 4 G/DL (ref 3.4–5)
ALP SERPL-CCNC: 76 U/L (ref 40–150)
ALT SERPL W P-5'-P-CCNC: 35 U/L (ref 0–70)
ANION GAP SERPL CALCULATED.3IONS-SCNC: 6 MMOL/L (ref 3–14)
AST SERPL W P-5'-P-CCNC: 21 U/L (ref 0–45)
BILIRUB SERPL-MCNC: 1.1 MG/DL (ref 0.2–1.3)
BUN SERPL-MCNC: 18 MG/DL (ref 7–30)
CALCIUM SERPL-MCNC: 9.1 MG/DL (ref 8.5–10.1)
CHLORIDE SERPL-SCNC: 105 MMOL/L (ref 94–109)
CHOLEST SERPL-MCNC: 119 MG/DL
CO2 SERPL-SCNC: 30 MMOL/L (ref 20–32)
CREAT SERPL-MCNC: 1.04 MG/DL (ref 0.66–1.25)
ERYTHROCYTE [DISTWIDTH] IN BLOOD BY AUTOMATED COUNT: 13.7 % (ref 10–15)
GFR SERPL CREATININE-BSD FRML MDRD: 81 ML/MIN/{1.73_M2}
GLUCOSE SERPL-MCNC: 94 MG/DL (ref 70–99)
HCT VFR BLD AUTO: 44.6 % (ref 40–53)
HDLC SERPL-MCNC: 57 MG/DL
HGB BLD-MCNC: 14.6 G/DL (ref 13.3–17.7)
LDLC SERPL CALC-MCNC: 51 MG/DL
MCH RBC QN AUTO: 28.5 PG (ref 26.5–33)
MCHC RBC AUTO-ENTMCNC: 32.7 G/DL (ref 31.5–36.5)
MCV RBC AUTO: 87 FL (ref 78–100)
NONHDLC SERPL-MCNC: 62 MG/DL
PLATELET # BLD AUTO: 194 10E9/L (ref 150–450)
POTASSIUM SERPL-SCNC: 4.1 MMOL/L (ref 3.4–5.3)
PROT SERPL-MCNC: 7.7 G/DL (ref 6.8–8.8)
PSA SERPL-ACNC: 0.77 UG/L (ref 0–4)
RBC # BLD AUTO: 5.12 10E12/L (ref 4.4–5.9)
SODIUM SERPL-SCNC: 141 MMOL/L (ref 133–144)
TRIGL SERPL-MCNC: 54 MG/DL
TSH SERPL DL<=0.005 MIU/L-ACNC: 1.68 MU/L (ref 0.4–4)
WBC # BLD AUTO: 5.8 10E9/L (ref 4–11)

## 2020-01-15 PROCEDURE — 80061 LIPID PANEL: CPT | Performed by: INTERNAL MEDICINE

## 2020-01-15 PROCEDURE — 99396 PREV VISIT EST AGE 40-64: CPT | Performed by: INTERNAL MEDICINE

## 2020-01-15 PROCEDURE — 36415 COLL VENOUS BLD VENIPUNCTURE: CPT | Performed by: INTERNAL MEDICINE

## 2020-01-15 PROCEDURE — 84443 ASSAY THYROID STIM HORMONE: CPT | Performed by: INTERNAL MEDICINE

## 2020-01-15 PROCEDURE — G0103 PSA SCREENING: HCPCS | Performed by: INTERNAL MEDICINE

## 2020-01-15 PROCEDURE — 85027 COMPLETE CBC AUTOMATED: CPT | Performed by: INTERNAL MEDICINE

## 2020-01-15 PROCEDURE — 80053 COMPREHEN METABOLIC PANEL: CPT | Performed by: INTERNAL MEDICINE

## 2020-01-15 SDOH — SOCIAL STABILITY: SOCIAL NETWORK
IN A TYPICAL WEEK, HOW MANY TIMES DO YOU TALK ON THE PHONE WITH FAMILY, FRIENDS, OR NEIGHBORS?: MORE THAN THREE TIMES A WEEK

## 2020-01-15 SDOH — SOCIAL STABILITY: SOCIAL NETWORK: HOW OFTEN DO YOU GET TOGETHER WITH FRIENDS OR RELATIVES?: ONCE A WEEK

## 2020-01-15 SDOH — SOCIAL STABILITY: SOCIAL NETWORK: HOW OFTEN DO YOU ATTEND CHURCH OR RELIGIOUS SERVICES?: 1 TO 4 TIMES PER YEAR

## 2020-01-15 SDOH — SOCIAL STABILITY: SOCIAL NETWORK: HOW OFTEN DO YOU ATTENT MEETINGS OF THE CLUB OR ORGANIZATION YOU BELONG TO?: MORE THAN 4 TIMES PER YEAR

## 2020-01-15 SDOH — SOCIAL STABILITY: SOCIAL NETWORK
DO YOU BELONG TO ANY CLUBS OR ORGANIZATIONS SUCH AS CHURCH GROUPS UNIONS, FRATERNAL OR ATHLETIC GROUPS, OR SCHOOL GROUPS?: YES

## 2020-01-15 ASSESSMENT — ENCOUNTER SYMPTOMS
WEAKNESS: 0
DIARRHEA: 0
HEARTBURN: 0
CHILLS: 0
HEMATURIA: 0
COUGH: 0
NAUSEA: 0
DIZZINESS: 0
ARTHRALGIAS: 0
FEVER: 0
HEADACHES: 0
DYSURIA: 0
MYALGIAS: 0
EYE PAIN: 0
SHORTNESS OF BREATH: 0
CONSTIPATION: 0
ABDOMINAL PAIN: 0
SORE THROAT: 0
JOINT SWELLING: 0
NERVOUS/ANXIOUS: 0
PARESTHESIAS: 0
PALPITATIONS: 0
FREQUENCY: 0
HEMATOCHEZIA: 0

## 2020-01-15 ASSESSMENT — MIFFLIN-ST. JEOR: SCORE: 1660.46

## 2020-01-15 NOTE — PROGRESS NOTES
SUBJECTIVE:   CC: Kory Ambrose is an 54 year old male who presents for preventative health visit.     Healthy Habits:     Getting at least 3 servings of Calcium per day:  Yes    Bi-annual eye exam:  Yes    Dental care twice a year:  Yes    Sleep apnea or symptoms of sleep apnea:  None    Diet:  Regular (no restrictions)    Frequency of exercise:  4-5 days/week    Duration of exercise:  45-60 minutes    Taking medications regularly:  Yes    Medication side effects:  None    PHQ-2 Total Score: 0    Additional concerns today:  No    Hyperlipidemia     Recent Labs   Lab Test 10/31/19  0753 01/08/19  0739  08/19/15  0930 07/11/14  0848   CHOL 105 257*   < > 120 136   HDL 45 48   < > 51 42   LDL 48 186*   < > 57 84   TRIG 61 116   < > 58 45   CHOLHDLRATIO  --   --   --  2.4 3.2    < > = values in this interval not displayed.     CT calcium score on 7/25/2019 was 0. Seen by cardiology and now on Crestor 20 mg daily.     Past/recent records reviewed and discussed for:  -Reviewed and updated medical hx, medications, social hx, family hx, and immunizations  -Last colonoscopy was on 12/2/2015--diverticulosis in sigmoid, descending, transverse, and ascending colon noted, otherwise normal. Due for repeat in 10 years.   -Seen by cardiology July 2019 for atypical chest pain. Echocardiogram and stress test were normal.       Today's PHQ-2 Score:   PHQ-2 ( 1999 Pfizer) 1/13/2020   Q1: Little interest or pleasure in doing things 0   Q2: Feeling down, depressed or hopeless 0   PHQ-2 Score 0   Q1: Little interest or pleasure in doing things Not at all   Q2: Feeling down, depressed or hopeless Not at all   PHQ-2 Score 0     Abuse: Current or Past(Physical, Sexual or Emotional)- No  Do you feel safe in your environment? Yes    Social History     Tobacco Use     Smoking status: Never Smoker     Smokeless tobacco: Never Used   Substance Use Topics     Alcohol use: Yes     Alcohol/week: 0.0 standard drinks     Frequency: 2-4 times a  month     Drinks per session: 1 or 2     Binge frequency: Never     Comment: One beer per week, occasional glass of wine     Alcohol Use 1/13/2020   Prescreen: >3 drinks/day or >7 drinks/week? No   Prescreen: >3 drinks/day or >7 drinks/week? -     Last PSA:   PSA   Date Value Ref Range Status   01/08/2019 0.99 0 - 4 ug/L Final     Comment:     Assay Method:  Chemiluminescence using Siemens Vista analyzer     Reviewed orders with patient. Reviewed health maintenance and updated orders accordingly - Yes  Labs reviewed in EPIC    Reviewed and updated as needed this visit by clinical staff  Tobacco  Allergies  Meds  Med Hx  Surg Hx  Fam Hx  Soc Hx        Reviewed and updated as needed this visit by Provider  Tobacco  Soc Hx         Review of Systems   Constitutional: Negative for chills and fever.   HENT: Negative for congestion, ear pain, hearing loss and sore throat.    Eyes: Negative for pain and visual disturbance.   Respiratory: Negative for cough and shortness of breath.    Cardiovascular: Negative for chest pain, palpitations and peripheral edema.   Gastrointestinal: Negative for abdominal pain, constipation, diarrhea, heartburn, hematochezia and nausea.   Genitourinary: Negative for discharge, dysuria, frequency, genital sores, hematuria, impotence and urgency.   Musculoskeletal: Negative for arthralgias, joint swelling and myalgias.   Skin: Negative for rash.   Neurological: Negative for dizziness, weakness, headaches and paresthesias.   Psychiatric/Behavioral: Negative for mood changes. The patient is not nervous/anxious.      This document serves as a record of the services and decisions personally performed and made by Isaac Mallory MD. It was created on his behalf by Rosalia Kebede, a trained medical scribe. The creation of this document is based on the provider's statements to the medical scribe.  Rosalia Kebede January 15, 2020 7:58 AM     OBJECTIVE:   /70 (BP Location: Left arm, Patient  "Position: Sitting, Cuff Size: Adult Regular)   Pulse 79   Temp 97.6  F (36.4  C) (Oral)   Resp 18   Ht 1.778 m (5' 10\")   Wt 81.4 kg (179 lb 8 oz)   SpO2 99%   BMI 25.76 kg/m      Physical Exam  GENERAL: healthy, alert and no distress  EYES: Eyes grossly normal to inspection, PERRL and conjunctivae and sclerae normal  HENT: ear canals and TM's normal, nose and mouth without ulcers or lesions  NECK: no adenopathy, no asymmetry, masses, or scars and thyroid normal to palpation  RESP: lungs clear to auscultation - no rales, rhonchi or wheezes  CV: regular rate and rhythm, normal S1 S2, no S3 or S4, no murmur, click or rub, no peripheral edema and peripheral pulses strong  ABDOMEN: soft, nontender, no hepatosplenomegaly, no masses and bowel sounds normal   (male): normal male genitalia without lesions or urethral discharge, no hernia  RECTAL: normal sphincter tone, no rectal masses, prostate normal size, smooth, nontender without nodules or masses  MS: no gross musculoskeletal defects noted, no edema  SKIN: no suspicious lesions or rashes  NEURO: Normal strength and tone, mentation intact and speech normal  PSYCH: mentation appears normal, affect normal/bright    Diagnostic Test Results:  Labs reviewed in Epic  No results found for this or any previous visit (from the past 24 hour(s)).    ASSESSMENT/PLAN:   (Z00.00) Routine general medical examination at a health care facility  (primary encounter diagnosis)  Comment: Stable health. See epic orders.  Plan: Comprehensive metabolic panel, Lipid panel         reflex to direct LDL Fasting, TSH with free T4         reflex, CBC with platelets, Prostate spec         antigen screen          (E78.5) Hyperlipidemia LDL goal <130  Comment: Update LDL. If within target range, continue current meds  Plan: Comprehensive metabolic panel, Lipid panel         reflex to direct LDL Fasting          (Z79.809) Medication management  Comment:   Plan: TSH with free T4 reflex, CBC with " "platelets          (Z12.5) Screening PSA (prostate specific antigen)  Comment:   Plan: Prostate spec antigen screen            Everything looks fine!    Refills of medications will be faxed to your pharmacy when requested.     I'll get back to you with lab results soon, especially if there is anything of concern.      See you in a year, sooner if problems.        COUNSELING:   Reviewed preventive health counseling, as reflected in patient instructions       Regular exercise       Healthy diet/nutrition       Colon cancer screening       Prostate cancer screening    Estimated body mass index is 25.76 kg/m  as calculated from the following:    Height as of this encounter: 1.778 m (5' 10\").    Weight as of this encounter: 81.4 kg (179 lb 8 oz).  Weight management plan: Discussed healthy diet and exercise guidelines   reports that he has never smoked. He has never used smokeless tobacco.    Counseling Resources:  ATP IV Guidelines  Pooled Cohorts Equation Calculator  FRAX Risk Assessment  ICSI Preventive Guidelines  Dietary Guidelines for Americans, 2010  USDA's MyPlate  ASA Prophylaxis  Lung CA Screening      The information in this document, created by the medical scribe for me, accurately reflects the services I personally performed and the decisions made by me. I have reviewed and approved this document for accuracy prior to leaving the patient care area.  January 15, 2020 8:14 AM    Isaac Mallory MD,   [facility]  "

## 2020-01-15 NOTE — NURSING NOTE
"/70 (BP Location: Left arm, Patient Position: Sitting, Cuff Size: Adult Regular)   Pulse 79   Temp 97.6  F (36.4  C) (Oral)   Resp 18   Ht 1.778 m (5' 10\")   Wt 81.4 kg (179 lb 8 oz)   SpO2 99%   BMI 25.76 kg/m    Patient is being seen for a physical and is fasting.  "

## 2020-01-15 NOTE — PATIENT INSTRUCTIONS
Preventive Health Recommendations  Male Ages 50 - 64    Yearly exam:             See your health care provider every year in order to  o   Review health changes.   o   Discuss preventive care.    o   Review your medicines if your doctor has prescribed any.     Have a cholesterol test every 5 years, or more frequently if you are at risk for high cholesterol/heart disease.     Have a diabetes test (fasting glucose) every three years. If you are at risk for diabetes, you should have this test more often.     Have a colonoscopy at age 50, or have a yearly FIT test (stool test). These exams will check for colon cancer.      Talk with your health care provider about whether or not a prostate cancer screening test (PSA) is right for you.    You should be tested each year for STDs (sexually transmitted diseases), if you re at risk.     Shots: Get a flu shot each year. Get a tetanus shot every 10 years.     Nutrition:    Eat at least 5 servings of fruits and vegetables daily.     Eat whole-grain bread, whole-wheat pasta and brown rice instead of white grains and rice.     Get adequate Calcium and Vitamin D.     Lifestyle    Exercise for at least 150 minutes a week (30 minutes a day, 5 days a week). This will help you control your weight and prevent disease.     Limit alcohol to one drink per day.     No smoking.     Wear sunscreen to prevent skin cancer.     See your dentist every six months for an exam and cleaning.     See your eye doctor every 1 to 2 years.      Everything looks fine!    Refills of medications will be faxed to your pharmacy when requested.     I'll get back to you with lab results soon, especially if there is anything of concern.      See you in a year, sooner if problems.

## 2020-11-06 ENCOUNTER — TELEPHONE (OUTPATIENT)
Dept: CARDIOLOGY | Facility: CLINIC | Age: 55
End: 2020-11-06

## 2020-11-06 NOTE — TELEPHONE ENCOUNTER
Fax sent to [facility] in [place]-patient no longer followed in our clinic. Asked them to send refills to Dr. Mallory.

## 2020-11-19 DIAGNOSIS — E78.5 HYPERLIPIDEMIA LDL GOAL <130: ICD-10-CM

## 2020-11-21 ENCOUNTER — MYC REFILL (OUTPATIENT)
Dept: CARDIOLOGY | Facility: CLINIC | Age: 55
End: 2020-11-21

## 2020-11-21 DIAGNOSIS — E78.5 HYPERLIPIDEMIA LDL GOAL <130: ICD-10-CM

## 2020-11-22 RX ORDER — ROSUVASTATIN CALCIUM 20 MG/1
20 TABLET, COATED ORAL DAILY
Qty: 90 TABLET | Refills: 0 | Status: SHIPPED | OUTPATIENT
Start: 2020-11-22 | End: 2021-01-13

## 2020-11-22 NOTE — TELEPHONE ENCOUNTER
Medication is being filled for 1 time refill only due to:  Patient needs to be seen because need annual exam.   Izabella Alcantar RN

## 2020-11-23 RX ORDER — ROSUVASTATIN CALCIUM 20 MG/1
20 TABLET, COATED ORAL DAILY
Qty: 90 TABLET | Refills: 3 | Status: CANCELLED | OUTPATIENT
Start: 2020-11-23

## 2021-01-04 ENCOUNTER — MYC MEDICAL ADVICE (OUTPATIENT)
Dept: INTERNAL MEDICINE | Facility: CLINIC | Age: 56
End: 2021-01-04

## 2021-01-04 DIAGNOSIS — E78.5 HYPERLIPIDEMIA LDL GOAL <130: ICD-10-CM

## 2021-01-13 RX ORDER — ROSUVASTATIN CALCIUM 20 MG/1
20 TABLET, COATED ORAL DAILY
Qty: 90 TABLET | Refills: 0 | Status: SHIPPED | OUTPATIENT
Start: 2021-01-13 | End: 2021-03-02

## 2021-01-13 NOTE — TELEPHONE ENCOUNTER
Medication is being filled for 1 time refill only due to:  Future appointment scheduled     Virtual Visit: 3/2/21 with Dr. Mallory.

## 2021-03-02 ENCOUNTER — VIRTUAL VISIT (OUTPATIENT)
Dept: INTERNAL MEDICINE | Facility: CLINIC | Age: 56
End: 2021-03-02
Payer: COMMERCIAL

## 2021-03-02 DIAGNOSIS — E78.5 HYPERLIPIDEMIA LDL GOAL <130: ICD-10-CM

## 2021-03-02 PROCEDURE — 99213 OFFICE O/P EST LOW 20 MIN: CPT | Mod: 95 | Performed by: INTERNAL MEDICINE

## 2021-03-02 RX ORDER — ROSUVASTATIN CALCIUM 20 MG/1
20 TABLET, COATED ORAL DAILY
Qty: 90 TABLET | Refills: 1 | Status: SHIPPED | OUTPATIENT
Start: 2021-03-02 | End: 2021-09-07

## 2021-03-02 NOTE — PROGRESS NOTES
Papo is a 55 year old who is being evaluated via a billable video visit.      How would you like to obtain your AVS? Mail a copy  If the video visit is dropped, the invitation should be resent by:   Will anyone else be joining your video visit? No    Video Start Time: 0927    Assessment & Plan   (E78.5) Hyperlipidemia LDL goal <130  Comment: Continue current meds. Labs and office appt within six months.   Plan: rosuvastatin (CRESTOR) 20 MG tablet            Patient Instructions   Good to see you today!    Refills of Crestor (rosuvastatin) sent to your pharmacy for another six months.     See me for a physical exam in 5-6 months.    Return in about 6 months (around 9/2/2021) for Physical Exam.    Isaac Mallory MD,   [facility]   Papo is a 55 year old who presents for the following health issues Patient is being seen for an refill request ( Rosuvastatin).  HPI       Hyperlipidemia Follow-Up      Are you regularly taking any medication or supplement to lower your cholesterol?   Yes- crestor    Are you having muscle aches or other side effects that you think could be caused by your cholesterol lowering medication?  No      How many servings of fruits and vegetables do you eat daily?  2-3    On average, how many sweetened beverages do you drink each day (Examples: soda, juice, sweet tea, etc.  Do NOT count diet or artificially sweetened beverages)?   0    How many days per week do you exercise enough to make your heart beat faster? 5    How many minutes a day do you exercise enough to make your heart beat faster? 60 or more    How many days per week do you miss taking your medication? 0      The patient is tolerating his current medications without any adverse effects.     Past medical, family and social histories as well as medications reviewed and updated as needed.    Review of Systems   REVIEW OF SYSTEMS: The following systems have been completely reviewed and are negative  except as noted above:   Constitutional, respiratory, cardiovascular, musculoskeletal and neurologic systems.        Objective           Vitals:  No vitals were obtained today due to virtual visit.    Physical Exam   GENERAL: Healthy, alert and no distress  EYES: Eyes grossly normal to inspection.  No discharge or erythema, or obvious scleral/conjunctival abnormalities.  RESP: No audible wheeze, cough, or visible cyanosis.  No visible retractions or increased work of breathing.    SKIN: Visible skin clear. No significant rash, abnormal pigmentation or lesions.  NEURO: Cranial nerves grossly intact.  Mentation and speech appropriate for age.  PSYCH: Mentation appears normal, affect normal/bright, judgement and insight intact, normal speech and appearance well-groomed.           Video-Visit Details    Type of service:  Video Visit    Video End Time: 0930    Originating Location (pt. Location): Home    Distant Location (provider location):  [facility]     Platform used for Video Visit: [employer] (used telephone for audio connection).

## 2021-03-02 NOTE — PATIENT INSTRUCTIONS
Good to see you today!    Refills of Crestor (rosuvastatin) sent to your pharmacy for another six months.     See me for a physical exam in 5-6 months.

## 2021-03-21 ENCOUNTER — HEALTH MAINTENANCE LETTER (OUTPATIENT)
Age: 56
End: 2021-03-21

## 2021-09-05 ENCOUNTER — HEALTH MAINTENANCE LETTER (OUTPATIENT)
Age: 56
End: 2021-09-05

## 2021-09-06 ASSESSMENT — ENCOUNTER SYMPTOMS
CHILLS: 0
PARESTHESIAS: 0
HEADACHES: 0
ARTHRALGIAS: 0
NERVOUS/ANXIOUS: 0
FEVER: 0
DIARRHEA: 0
EYE PAIN: 0
COUGH: 0
PALPITATIONS: 0
DIZZINESS: 0
SORE THROAT: 0
HEMATOCHEZIA: 0
FREQUENCY: 0
HEARTBURN: 0
WEAKNESS: 0
JOINT SWELLING: 0
DYSURIA: 0
ABDOMINAL PAIN: 0
HEMATURIA: 0
CONSTIPATION: 0
SHORTNESS OF BREATH: 0
MYALGIAS: 0
NAUSEA: 0

## 2021-09-07 ENCOUNTER — OFFICE VISIT (OUTPATIENT)
Dept: INTERNAL MEDICINE | Facility: CLINIC | Age: 56
End: 2021-09-07
Payer: COMMERCIAL

## 2021-09-07 VITALS
WEIGHT: 187 LBS | RESPIRATION RATE: 16 BRPM | TEMPERATURE: 96.5 F | BODY MASS INDEX: 26.77 KG/M2 | HEART RATE: 86 BPM | HEIGHT: 70 IN | SYSTOLIC BLOOD PRESSURE: 118 MMHG | OXYGEN SATURATION: 100 % | DIASTOLIC BLOOD PRESSURE: 68 MMHG

## 2021-09-07 DIAGNOSIS — E78.5 HYPERLIPIDEMIA LDL GOAL <130: ICD-10-CM

## 2021-09-07 DIAGNOSIS — Z00.00 ROUTINE GENERAL MEDICAL EXAMINATION AT A HEALTH CARE FACILITY: Primary | ICD-10-CM

## 2021-09-07 DIAGNOSIS — Z12.5 SCREENING PSA (PROSTATE SPECIFIC ANTIGEN): ICD-10-CM

## 2021-09-07 DIAGNOSIS — Z79.899 MEDICATION MANAGEMENT: ICD-10-CM

## 2021-09-07 LAB
ERYTHROCYTE [DISTWIDTH] IN BLOOD BY AUTOMATED COUNT: 13.3 % (ref 10–15)
HCT VFR BLD AUTO: 43.6 % (ref 40–53)
HGB BLD-MCNC: 14.6 G/DL (ref 13.3–17.7)
MCH RBC QN AUTO: 29.4 PG (ref 26.5–33)
MCHC RBC AUTO-ENTMCNC: 33.5 G/DL (ref 31.5–36.5)
MCV RBC AUTO: 88 FL (ref 78–100)
PLATELET # BLD AUTO: 189 10E3/UL (ref 150–450)
RBC # BLD AUTO: 4.96 10E6/UL (ref 4.4–5.9)
WBC # BLD AUTO: 5.5 10E3/UL (ref 4–11)

## 2021-09-07 PROCEDURE — 80061 LIPID PANEL: CPT | Performed by: INTERNAL MEDICINE

## 2021-09-07 PROCEDURE — G0103 PSA SCREENING: HCPCS | Performed by: INTERNAL MEDICINE

## 2021-09-07 PROCEDURE — 99396 PREV VISIT EST AGE 40-64: CPT | Performed by: INTERNAL MEDICINE

## 2021-09-07 PROCEDURE — 80053 COMPREHEN METABOLIC PANEL: CPT | Performed by: INTERNAL MEDICINE

## 2021-09-07 PROCEDURE — 85027 COMPLETE CBC AUTOMATED: CPT | Performed by: INTERNAL MEDICINE

## 2021-09-07 PROCEDURE — 84443 ASSAY THYROID STIM HORMONE: CPT | Performed by: INTERNAL MEDICINE

## 2021-09-07 PROCEDURE — 36415 COLL VENOUS BLD VENIPUNCTURE: CPT | Performed by: INTERNAL MEDICINE

## 2021-09-07 RX ORDER — ROSUVASTATIN CALCIUM 20 MG/1
20 TABLET, COATED ORAL DAILY
Qty: 90 TABLET | Refills: 3 | Status: SHIPPED | OUTPATIENT
Start: 2021-09-07 | End: 2022-09-15

## 2021-09-07 SDOH — HEALTH STABILITY: PHYSICAL HEALTH: ON AVERAGE, HOW MANY MINUTES DO YOU ENGAGE IN EXERCISE AT THIS LEVEL?: 60 MIN

## 2021-09-07 SDOH — ECONOMIC STABILITY: FOOD INSECURITY: WITHIN THE PAST 12 MONTHS, THE FOOD YOU BOUGHT JUST DIDN'T LAST AND YOU DIDN'T HAVE MONEY TO GET MORE.: NEVER TRUE

## 2021-09-07 SDOH — ECONOMIC STABILITY: INCOME INSECURITY: IN THE LAST 12 MONTHS, WAS THERE A TIME WHEN YOU WERE NOT ABLE TO PAY THE MORTGAGE OR RENT ON TIME?: NO

## 2021-09-07 SDOH — HEALTH STABILITY: PHYSICAL HEALTH: ON AVERAGE, HOW MANY DAYS PER WEEK DO YOU ENGAGE IN MODERATE TO STRENUOUS EXERCISE (LIKE A BRISK WALK)?: 5 DAYS

## 2021-09-07 SDOH — ECONOMIC STABILITY: FOOD INSECURITY: WITHIN THE PAST 12 MONTHS, YOU WORRIED THAT YOUR FOOD WOULD RUN OUT BEFORE YOU GOT MONEY TO BUY MORE.: NEVER TRUE

## 2021-09-07 ASSESSMENT — ENCOUNTER SYMPTOMS
HEARTBURN: 0
DIZZINESS: 0
DYSURIA: 0
CONSTIPATION: 0
HEMATURIA: 0
HEADACHES: 0
COUGH: 0
PARESTHESIAS: 0
MYALGIAS: 0
ARTHRALGIAS: 0
WEAKNESS: 0
DIARRHEA: 0
SORE THROAT: 0
ABDOMINAL PAIN: 0
CHILLS: 0
JOINT SWELLING: 0
FREQUENCY: 0
SHORTNESS OF BREATH: 0
FEVER: 0
PALPITATIONS: 0
EYE PAIN: 0
HEMATOCHEZIA: 0
NERVOUS/ANXIOUS: 0
NAUSEA: 0

## 2021-09-07 ASSESSMENT — LIFESTYLE VARIABLES
HOW OFTEN DO YOU HAVE A DRINK CONTAINING ALCOHOL: 2-4 TIMES A MONTH
HOW OFTEN DO YOU HAVE SIX OR MORE DRINKS ON ONE OCCASION: NEVER
HOW MANY STANDARD DRINKS CONTAINING ALCOHOL DO YOU HAVE ON A TYPICAL DAY: 1 OR 2

## 2021-09-07 ASSESSMENT — SOCIAL DETERMINANTS OF HEALTH (SDOH)
WITHIN THE LAST YEAR, HAVE YOU BEEN KICKED, HIT, SLAPPED, OR OTHERWISE PHYSICALLY HURT BY YOUR PARTNER OR EX-PARTNER?: NO
WITHIN THE LAST YEAR, HAVE YOU BEEN AFRAID OF YOUR PARTNER OR EX-PARTNER?: NO
WITHIN THE LAST YEAR, HAVE YOU BEEN HUMILIATED OR EMOTIONALLY ABUSED IN OTHER WAYS BY YOUR PARTNER OR EX-PARTNER?: NO
HOW HARD IS IT FOR YOU TO PAY FOR THE VERY BASICS LIKE FOOD, HOUSING, MEDICAL CARE, AND HEATING?: NOT HARD AT ALL
WITHIN THE LAST YEAR, HAVE TO BEEN RAPED OR FORCED TO HAVE ANY KIND OF SEXUAL ACTIVITY BY YOUR PARTNER OR EX-PARTNER?: NO

## 2021-09-07 ASSESSMENT — MIFFLIN-ST. JEOR: SCORE: 1696.97

## 2021-09-07 NOTE — PROGRESS NOTES
SUBJECTIVE:   CC: Kory Ambrose is an 55 year old male who presents for preventative health visit.       Patient has been advised of split billing requirements and indicates understanding: Yes  Healthy Habits:     Getting at least 3 servings of Calcium per day:  Yes    Bi-annual eye exam:  Yes    Dental care twice a year:  Yes    Sleep apnea or symptoms of sleep apnea:  None    Diet:  Regular (no restrictions)    Frequency of exercise:  4-5 days/week    Duration of exercise:  45-60 minutes    Taking medications regularly:  Yes    Medication side effects:  None    PHQ-2 Total Score: 0    Additional concerns today:  No        PROBLEMS TO ADD ON...    Today's PHQ-2 Score:   PHQ-2 ( 1999 Pfizer) 9/6/2021   Q1: Little interest or pleasure in doing things 0   Q2: Feeling down, depressed or hopeless 0   PHQ-2 Score 0   Q1: Little interest or pleasure in doing things Not at all   Q2: Feeling down, depressed or hopeless Not at all   PHQ-2 Score 0       Abuse: Current or Past(Physical, Sexual or Emotional)- No  Do you feel safe in your environment? Yes        Social History     Tobacco Use     Smoking status: Never Smoker     Smokeless tobacco: Never Used   Substance Use Topics     Alcohol use: Yes     Alcohol/week: 0.0 standard drinks     Comment: One beer per week, occasional glass of wine     If you drink alcohol do you typically have >3 drinks per day or >7 drinks per week? No    Alcohol Use 9/6/2021   Prescreen: >3 drinks/day or >7 drinks/week? No   Prescreen: >3 drinks/day or >7 drinks/week? -       Last PSA:   PSA   Date Value Ref Range Status   01/15/2020 0.77 0 - 4 ug/L Final     Comment:     Assay Method:  Chemiluminescence using Siemens Vista analyzer       Reviewed orders with patient. Reviewed health maintenance and updated orders accordingly - Yes    Reviewed and updated as needed this visit by clinical staff  Tobacco  Allergies  Meds   Med Hx  Surg Hx  Fam Hx  Soc Hx        Reviewed and updated as  "needed this visit by Provider                  Review of Systems   Constitutional: Negative for chills and fever.   HENT: Negative for congestion, ear pain, hearing loss and sore throat.    Eyes: Negative for pain and visual disturbance.   Respiratory: Negative for cough and shortness of breath.    Cardiovascular: Negative for chest pain, palpitations and peripheral edema.   Gastrointestinal: Negative for abdominal pain, constipation, diarrhea, heartburn, hematochezia and nausea.   Genitourinary: Negative for discharge, dysuria, frequency, genital sores, hematuria, impotence and urgency.   Musculoskeletal: Negative for arthralgias, joint swelling and myalgias.   Skin: Negative for rash.   Neurological: Negative for dizziness, weakness, headaches and paresthesias.   Psychiatric/Behavioral: Negative for mood changes. The patient is not nervous/anxious.        OBJECTIVE:   Vitals: /68   Pulse 86   Temp (!) 96.5  F (35.8  C) (Oral)   Resp 16   Ht 1.79 m (5' 10.47\")   Wt 84.8 kg (187 lb)   SpO2 100%   BMI 26.47 kg/m    BMI= Body mass index is 26.47 kg/m .     Physical Exam  GENERAL: healthy, alert and no distress  EYES: Eyes grossly normal to inspection, PERRL and conjunctivae and sclerae normal  HENT: ear canals and TM's normal, nose and mouth without ulcers or lesions  NECK: no adenopathy, no asymmetry, masses, or scars and thyroid normal to palpation  RESP: lungs clear to auscultation - no rales, rhonchi or wheezes  CV: regular rate and rhythm, normal S1 S2, no S3 or S4, no murmur, click or rub, no peripheral edema and peripheral pulses strong  ABDOMEN: soft, nontender, no hepatosplenomegaly, no masses and bowel sounds normal  MS: no gross musculoskeletal defects noted, no edema  SKIN: no suspicious lesions or rashes  NEURO: Normal strength and tone, mentation intact and speech normal  PSYCH: mentation appears normal, affect normal/bright    Diagnostic Test Results:  Labs reviewed in " "Epic    ASSESSMENT/PLAN:   (Z00.00) Routine general medical examination at a health care facility  (primary encounter diagnosis)  Comment: Stable health. See epic orders.     (E78.5) Hyperlipidemia LDL goal <130  Comment: LDL at target. Continue current meds.   Plan: rosuvastatin (CRESTOR) 20 MG tablet,         **Comprehensive metabolic panel FUTURE 6mo,         Lipid panel reflex to direct LDL Fasting          (Z12.5) Screening PSA (prostate specific antigen)  Plan: PSA, screen          (Z79.899) Medication management  Plan: **TSH with free T4 reflex FUTURE 6mo, **CBC         with platelets FUTURE 6mo            Patient has been advised of split billing requirements and indicates understanding: Yes  COUNSELING:   Reviewed preventive health counseling, as reflected in patient instructions    Estimated body mass index is 25.76 kg/m  as calculated from the following:    Height as of 1/15/20: 1.778 m (5' 10\").    Weight as of 1/15/20: 81.4 kg (179 lb 8 oz).         He reports that he has never smoked. He has never used smokeless tobacco.      Counseling Resources:  ATP IV Guidelines  Pooled Cohorts Equation Calculator  FRAX Risk Assessment  ICSI Preventive Guidelines  Dietary Guidelines for Americans, 2010  [place]'s MyPlate  ASA Prophylaxis  Lung CA Screening    Isaac Mallory MD, MD  [facility]  "

## 2021-09-07 NOTE — PATIENT INSTRUCTIONS
Everything looks fine!    Refills of medication have been faxed to your pharmacy.     Lab results will be available soon on [employer].    See me in a year, sooner if problems.

## 2021-09-08 LAB
ALBUMIN SERPL-MCNC: 3.7 G/DL (ref 3.4–5)
ALP SERPL-CCNC: 74 U/L (ref 40–150)
ALT SERPL W P-5'-P-CCNC: 48 U/L (ref 0–70)
ANION GAP SERPL CALCULATED.3IONS-SCNC: 4 MMOL/L (ref 3–14)
AST SERPL W P-5'-P-CCNC: 25 U/L (ref 0–45)
BILIRUB SERPL-MCNC: 1.6 MG/DL (ref 0.2–1.3)
BUN SERPL-MCNC: 13 MG/DL (ref 7–30)
CALCIUM SERPL-MCNC: 8.4 MG/DL (ref 8.5–10.1)
CHLORIDE BLD-SCNC: 107 MMOL/L (ref 94–109)
CHOLEST SERPL-MCNC: 147 MG/DL
CO2 SERPL-SCNC: 27 MMOL/L (ref 20–32)
CREAT SERPL-MCNC: 1.27 MG/DL (ref 0.66–1.25)
FASTING STATUS PATIENT QL REPORTED: YES
GFR SERPL CREATININE-BSD FRML MDRD: 63 ML/MIN/1.73M2
GLUCOSE BLD-MCNC: 84 MG/DL (ref 70–99)
HDLC SERPL-MCNC: 49 MG/DL
LDLC SERPL CALC-MCNC: 86 MG/DL
NONHDLC SERPL-MCNC: 98 MG/DL
POTASSIUM BLD-SCNC: 4.4 MMOL/L (ref 3.4–5.3)
PROT SERPL-MCNC: 7.1 G/DL (ref 6.8–8.8)
PSA SERPL-MCNC: 0.92 UG/L (ref 0–4)
SODIUM SERPL-SCNC: 138 MMOL/L (ref 133–144)
TRIGL SERPL-MCNC: 60 MG/DL
TSH SERPL DL<=0.005 MIU/L-ACNC: 1.68 MU/L (ref 0.4–4)

## 2022-01-04 ENCOUNTER — TRANSFERRED RECORDS (OUTPATIENT)
Dept: HEALTH INFORMATION MANAGEMENT | Facility: CLINIC | Age: 57
End: 2022-01-04
Payer: COMMERCIAL

## 2022-09-14 DIAGNOSIS — E78.5 HYPERLIPIDEMIA LDL GOAL <130: ICD-10-CM

## 2022-09-15 RX ORDER — ROSUVASTATIN CALCIUM 20 MG/1
TABLET, COATED ORAL
Qty: 90 TABLET | Refills: 0 | Status: SHIPPED | OUTPATIENT
Start: 2022-09-15 | End: 2023-01-02

## 2022-09-15 NOTE — TELEPHONE ENCOUNTER
Rosuvastatin (Crestor) 20 mg tab  Medication is being filled for 1 time refill only due to:  Patient needs labs LDL. Patient needs to be seen because it has been more than one year since last visit.  LOV 09/2021

## 2022-10-23 ENCOUNTER — HEALTH MAINTENANCE LETTER (OUTPATIENT)
Age: 57
End: 2022-10-23

## 2022-12-30 DIAGNOSIS — E78.5 HYPERLIPIDEMIA LDL GOAL <130: ICD-10-CM

## 2023-01-02 RX ORDER — ROSUVASTATIN CALCIUM 20 MG/1
TABLET, COATED ORAL
Qty: 90 TABLET | Refills: 0 | Status: SHIPPED | OUTPATIENT
Start: 2023-01-02 | End: 2023-01-24

## 2023-01-02 NOTE — TELEPHONE ENCOUNTER
Medication is being filled for 1 time refill only due to:  Future appointment scheduled   Future Appointments 1/2/2023 - 7/1/2023      Date Visit Type Length Department Provider     1/24/2023  7:00 AM [facility] PREVENTATIVE ADULT VISIT 30 min RI Isaac Zendejas MD    Location Instructions:     [facility] is in the [facility] at [address] next to [facility]. This is near the [address] split and the [address]  exits off of [address]. To reach the clinic from [place] [address], turn north onto [address], then turn east on [address]. Clinic parking is available next to the [facility], which is just east of the hospital s main entrance.                     Joy Dorado RN  [facility]

## 2023-01-23 ASSESSMENT — ENCOUNTER SYMPTOMS
MYALGIAS: 0
NAUSEA: 0
ABDOMINAL PAIN: 0
COUGH: 0
HEMATOCHEZIA: 0
HEADACHES: 0
DYSURIA: 0
CONSTIPATION: 0
NERVOUS/ANXIOUS: 0
SHORTNESS OF BREATH: 0
DIARRHEA: 0
HEARTBURN: 0
FREQUENCY: 0
JOINT SWELLING: 0
WEAKNESS: 0
CHILLS: 0
HEMATURIA: 0
SORE THROAT: 0
ARTHRALGIAS: 0
PALPITATIONS: 0
EYE PAIN: 0
FEVER: 0
DIZZINESS: 0
PARESTHESIAS: 0

## 2023-01-24 ENCOUNTER — OFFICE VISIT (OUTPATIENT)
Dept: INTERNAL MEDICINE | Facility: CLINIC | Age: 58
End: 2023-01-24
Payer: COMMERCIAL

## 2023-01-24 ENCOUNTER — TELEPHONE (OUTPATIENT)
Dept: INTERNAL MEDICINE | Facility: CLINIC | Age: 58
End: 2023-01-24

## 2023-01-24 VITALS
SYSTOLIC BLOOD PRESSURE: 126 MMHG | RESPIRATION RATE: 16 BRPM | HEART RATE: 85 BPM | OXYGEN SATURATION: 99 % | BODY MASS INDEX: 28.2 KG/M2 | HEIGHT: 70 IN | WEIGHT: 197 LBS | TEMPERATURE: 97.8 F | DIASTOLIC BLOOD PRESSURE: 76 MMHG

## 2023-01-24 DIAGNOSIS — Z79.899 MEDICATION MANAGEMENT: ICD-10-CM

## 2023-01-24 DIAGNOSIS — Z11.59 NEED FOR HEPATITIS C SCREENING TEST: ICD-10-CM

## 2023-01-24 DIAGNOSIS — Z00.00 ROUTINE GENERAL MEDICAL EXAMINATION AT A HEALTH CARE FACILITY: Primary | ICD-10-CM

## 2023-01-24 DIAGNOSIS — E78.5 HYPERLIPIDEMIA LDL GOAL <130: ICD-10-CM

## 2023-01-24 DIAGNOSIS — Z11.4 SCREENING FOR HIV (HUMAN IMMUNODEFICIENCY VIRUS): ICD-10-CM

## 2023-01-24 DIAGNOSIS — Z12.5 SCREENING PSA (PROSTATE SPECIFIC ANTIGEN): ICD-10-CM

## 2023-01-24 LAB
ALBUMIN SERPL BCG-MCNC: 4.3 G/DL (ref 3.5–5.2)
ALP SERPL-CCNC: 76 U/L (ref 40–129)
ALT SERPL W P-5'-P-CCNC: 24 U/L (ref 10–50)
ANION GAP SERPL CALCULATED.3IONS-SCNC: 10 MMOL/L (ref 7–15)
AST SERPL W P-5'-P-CCNC: 26 U/L (ref 10–50)
BILIRUB SERPL-MCNC: 0.8 MG/DL
BUN SERPL-MCNC: 17.7 MG/DL (ref 6–20)
CALCIUM SERPL-MCNC: 10 MG/DL (ref 8.6–10)
CHLORIDE SERPL-SCNC: 103 MMOL/L (ref 98–107)
CHOLEST SERPL-MCNC: 141 MG/DL
CREAT SERPL-MCNC: 1.12 MG/DL (ref 0.67–1.17)
DEPRECATED HCO3 PLAS-SCNC: 26 MMOL/L (ref 22–29)
ERYTHROCYTE [DISTWIDTH] IN BLOOD BY AUTOMATED COUNT: 12.6 % (ref 10–15)
GFR SERPL CREATININE-BSD FRML MDRD: 77 ML/MIN/1.73M2
GLUCOSE SERPL-MCNC: 99 MG/DL (ref 70–99)
HCT VFR BLD AUTO: 43.4 % (ref 40–53)
HCV AB SERPL QL IA: NONREACTIVE
HDLC SERPL-MCNC: 47 MG/DL
HGB BLD-MCNC: 14.7 G/DL (ref 13.3–17.7)
HIV 1+2 AB+HIV1 P24 AG SERPL QL IA: NONREACTIVE
LDLC SERPL CALC-MCNC: 81 MG/DL
MCH RBC QN AUTO: 28.9 PG (ref 26.5–33)
MCHC RBC AUTO-ENTMCNC: 33.9 G/DL (ref 31.5–36.5)
MCV RBC AUTO: 85 FL (ref 78–100)
NONHDLC SERPL-MCNC: 94 MG/DL
PLATELET # BLD AUTO: 205 10E3/UL (ref 150–450)
POTASSIUM SERPL-SCNC: 4.6 MMOL/L (ref 3.4–5.3)
PROT SERPL-MCNC: 7.1 G/DL (ref 6.4–8.3)
PSA SERPL-MCNC: 1 NG/ML (ref 0–3.5)
RBC # BLD AUTO: 5.09 10E6/UL (ref 4.4–5.9)
SODIUM SERPL-SCNC: 139 MMOL/L (ref 136–145)
TRIGL SERPL-MCNC: 64 MG/DL
TSH SERPL DL<=0.005 MIU/L-ACNC: 2.34 UIU/ML (ref 0.3–4.2)
WBC # BLD AUTO: 6.7 10E3/UL (ref 4–11)

## 2023-01-24 PROCEDURE — G0103 PSA SCREENING: HCPCS | Performed by: INTERNAL MEDICINE

## 2023-01-24 PROCEDURE — 99396 PREV VISIT EST AGE 40-64: CPT | Mod: 25 | Performed by: INTERNAL MEDICINE

## 2023-01-24 PROCEDURE — 80053 COMPREHEN METABOLIC PANEL: CPT | Performed by: INTERNAL MEDICINE

## 2023-01-24 PROCEDURE — 87389 HIV-1 AG W/HIV-1&-2 AB AG IA: CPT | Performed by: INTERNAL MEDICINE

## 2023-01-24 PROCEDURE — 90471 IMMUNIZATION ADMIN: CPT | Performed by: INTERNAL MEDICINE

## 2023-01-24 PROCEDURE — 85027 COMPLETE CBC AUTOMATED: CPT | Performed by: INTERNAL MEDICINE

## 2023-01-24 PROCEDURE — 84443 ASSAY THYROID STIM HORMONE: CPT | Performed by: INTERNAL MEDICINE

## 2023-01-24 PROCEDURE — 2894A PR OFFICE/OUTPATIENT ESTABLISHED LOW MDM 20-29 MIN: CPT | Mod: 25 | Performed by: INTERNAL MEDICINE

## 2023-01-24 PROCEDURE — 86803 HEPATITIS C AB TEST: CPT | Performed by: INTERNAL MEDICINE

## 2023-01-24 PROCEDURE — 90715 TDAP VACCINE 7 YRS/> IM: CPT | Performed by: INTERNAL MEDICINE

## 2023-01-24 PROCEDURE — 80061 LIPID PANEL: CPT | Performed by: INTERNAL MEDICINE

## 2023-01-24 PROCEDURE — 36415 COLL VENOUS BLD VENIPUNCTURE: CPT | Performed by: INTERNAL MEDICINE

## 2023-01-24 RX ORDER — ROSUVASTATIN CALCIUM 20 MG/1
20 TABLET, COATED ORAL DAILY
Qty: 90 TABLET | Refills: 3 | Status: SHIPPED | OUTPATIENT
Start: 2023-01-24 | End: 2024-02-13

## 2023-01-24 SDOH — ECONOMIC STABILITY: FOOD INSECURITY: WITHIN THE PAST 12 MONTHS, THE FOOD YOU BOUGHT JUST DIDN'T LAST AND YOU DIDN'T HAVE MONEY TO GET MORE.: NEVER TRUE

## 2023-01-24 SDOH — ECONOMIC STABILITY: INCOME INSECURITY: IN THE LAST 12 MONTHS, WAS THERE A TIME WHEN YOU WERE NOT ABLE TO PAY THE MORTGAGE OR RENT ON TIME?: NO

## 2023-01-24 SDOH — ECONOMIC STABILITY: FOOD INSECURITY: WITHIN THE PAST 12 MONTHS, YOU WORRIED THAT YOUR FOOD WOULD RUN OUT BEFORE YOU GOT MONEY TO BUY MORE.: NEVER TRUE

## 2023-01-24 SDOH — HEALTH STABILITY: PHYSICAL HEALTH: ON AVERAGE, HOW MANY DAYS PER WEEK DO YOU ENGAGE IN MODERATE TO STRENUOUS EXERCISE (LIKE A BRISK WALK)?: 5 DAYS

## 2023-01-24 SDOH — HEALTH STABILITY: PHYSICAL HEALTH: ON AVERAGE, HOW MANY MINUTES DO YOU ENGAGE IN EXERCISE AT THIS LEVEL?: 60 MIN

## 2023-01-24 ASSESSMENT — LIFESTYLE VARIABLES
SKIP TO QUESTIONS 9-10: 1
AUDIT-C TOTAL SCORE: 2
HOW MANY STANDARD DRINKS CONTAINING ALCOHOL DO YOU HAVE ON A TYPICAL DAY: 1 OR 2
HOW OFTEN DO YOU HAVE SIX OR MORE DRINKS ON ONE OCCASION: NEVER
HOW OFTEN DO YOU HAVE A DRINK CONTAINING ALCOHOL: 2-4 TIMES A MONTH

## 2023-01-24 ASSESSMENT — ENCOUNTER SYMPTOMS
EYE PAIN: 0
SHORTNESS OF BREATH: 0
CHILLS: 0
CONSTIPATION: 0
SORE THROAT: 0
FEVER: 0
NERVOUS/ANXIOUS: 0
JOINT SWELLING: 0
MYALGIAS: 0
ABDOMINAL PAIN: 0
PARESTHESIAS: 0
ARTHRALGIAS: 0
DIARRHEA: 0
FREQUENCY: 0
HEMATOCHEZIA: 0
HEADACHES: 0
DYSURIA: 0
PALPITATIONS: 0
WEAKNESS: 0
NAUSEA: 0
HEMATURIA: 0
COUGH: 0
HEARTBURN: 0
DIZZINESS: 0

## 2023-01-24 ASSESSMENT — SOCIAL DETERMINANTS OF HEALTH (SDOH)
WITHIN THE LAST YEAR, HAVE TO BEEN RAPED OR FORCED TO HAVE ANY KIND OF SEXUAL ACTIVITY BY YOUR PARTNER OR EX-PARTNER?: NO
WITHIN THE LAST YEAR, HAVE YOU BEEN AFRAID OF YOUR PARTNER OR EX-PARTNER?: NO
HOW HARD IS IT FOR YOU TO PAY FOR THE VERY BASICS LIKE FOOD, HOUSING, MEDICAL CARE, AND HEATING?: NOT HARD AT ALL
WITHIN THE LAST YEAR, HAVE YOU BEEN HUMILIATED OR EMOTIONALLY ABUSED IN OTHER WAYS BY YOUR PARTNER OR EX-PARTNER?: NO
WITHIN THE LAST YEAR, HAVE YOU BEEN KICKED, HIT, SLAPPED, OR OTHERWISE PHYSICALLY HURT BY YOUR PARTNER OR EX-PARTNER?: NO

## 2023-01-24 NOTE — PATIENT INSTRUCTIONS
"I believe that the episodic discomfort in the groin is coming from irritation (but not infection) of the \"epididymis\", or the tube that carries sperm from the testicle. Treatment is generally mechanical protection to the area until it improves. No signs of problems on exam today.     Everything looks fine!    Refills of medication have been faxed to your pharmacy.     Lab results will be available soon on [employer].    See me in a year, sooner if problems.     "

## 2023-01-24 NOTE — PROGRESS NOTES
SUBJECTIVE:   CC: Papo is an 57 year old who presents for preventative health visit.       Patient has been advised of split billing requirements and indicates understanding: Yes  Healthy Habits:     Getting at least 3 servings of Calcium per day:  Yes    Bi-annual eye exam:  Yes    Dental care twice a year:  Yes    Sleep apnea or symptoms of sleep apnea:  None    Diet:  Regular (no restrictions)    Frequency of exercise:  4-5 days/week    Duration of exercise:  45-60 minutes    Taking medications regularly:  Yes    Medication side effects:  None    PHQ-2 Total Score: 0    Additional concerns today:  No            PROBLEMS TO ADD ON...In addition to a preventive exam, we addressed hyperlipidemia.     The patient is tolerating his current medications without any adverse effects.   LDL-Cholesterol historically well controlled on rosuvastatin. We agreed to update lipids along with other labs today.       Today's PHQ-2 Score:   PHQ-2 ( 1999 Pfizer) 1/23/2023   Q1: Little interest or pleasure in doing things 0   Q2: Feeling down, depressed or hopeless 0   PHQ-2 Score 0   PHQ-2 Total Score (12-17 Years)- Positive if 3 or more points; Administer PHQ-A if positive -   Q1: Little interest or pleasure in doing things Not at all   Q2: Feeling down, depressed or hopeless Not at all   PHQ-2 Score 0           Social History     Tobacco Use     Smoking status: Never     Smokeless tobacco: Never   Substance Use Topics     Alcohol use: Yes     Alcohol/week: 0.0 standard drinks     Comment: One beer per week, occasional glass of wine     If you drink alcohol do you typically have >3 drinks per day or >7 drinks per week? No    Alcohol Use 1/23/2023   Prescreen: >3 drinks/day or >7 drinks/week? No   Prescreen: >3 drinks/day or >7 drinks/week? -       Last PSA:   PSA   Date Value Ref Range Status   01/15/2020 0.77 0 - 4 ug/L Final     Comment:     Assay Method:  Chemiluminescence using Siemens Vista analyzer     Prostate Specific Antigen  "Screen   Date Value Ref Range Status   09/07/2021 0.92 0.00 - 4.00 ug/L Final       Reviewed orders with patient. Reviewed health maintenance and updated orders accordingly - Yes    Reviewed and updated as needed this visit by clinical staff   Tobacco  Allergies  Meds              Reviewed and updated as needed this visit by Provider                   ROS: occasional discomfort in the left groin near left testicle region, self-limited. No genital lesions or urinary complaints.   Review of Systems   Constitutional: Negative for chills and fever.   HENT: Negative for congestion, ear pain, hearing loss and sore throat.    Eyes: Negative for pain and visual disturbance.   Respiratory: Negative for cough and shortness of breath.    Cardiovascular: Negative for chest pain, palpitations and peripheral edema.   Gastrointestinal: Negative for abdominal pain, constipation, diarrhea, heartburn, hematochezia and nausea.   Genitourinary: Positive for genital sores. Negative for dysuria, frequency, hematuria, impotence, penile discharge and urgency.   Musculoskeletal: Negative for arthralgias, joint swelling and myalgias.   Skin: Negative for rash.   Neurological: Negative for dizziness, weakness, headaches and paresthesias.   Psychiatric/Behavioral: Negative for mood changes. The patient is not nervous/anxious.        OBJECTIVE:   /76 (BP Location: Left arm, Patient Position: Sitting, Cuff Size: Adult Large)   Pulse 85   Temp 97.8  F (36.6  C) (Tympanic)   Resp 16   Ht 1.79 m (5' 10.47\")   Wt 89.4 kg (197 lb)   SpO2 99%   BMI 27.89 kg/m      Physical Exam  GENERAL: healthy, alert and no distress  EYES: Eyes grossly normal to inspection, PERRL and conjunctivae and sclerae normal  HENT: ear canals and TM's normal, nose and mouth without ulcers or lesions  NECK: no adenopathy, no asymmetry, masses, or scars and thyroid normal to palpation  RESP: lungs clear to auscultation - no rales, rhonchi or wheezes  CV: regular " rate and rhythm, normal S1 S2, no S3 or S4, no murmur, click or rub, no peripheral edema and peripheral pulses strong  ABDOMEN: soft, nontender, no hepatosplenomegaly, no masses and bowel sounds normal   (male): normal male genitalia without lesions or urethral discharge, no hernia  MS: no gross musculoskeletal defects noted, no edema  SKIN: no suspicious lesions or rashes  NEURO: Normal strength and tone, mentation intact and speech normal  PSYCH: mentation appears normal, affect normal/bright    Diagnostic Test Results:  Labs reviewed in Epic    ASSESSMENT/PLAN:   (Z00.00) Routine general medical examination at a health care facility  (primary encounter diagnosis)  Comment: Stable health. See epic orders.   Plan: **TSH with free T4 reflex FUTURE 2mo, Lipid         panel reflex to direct LDL Fasting,         **Comprehensive metabolic panel FUTURE 2mo,         **CBC with platelets FUTURE 2mo          (E78.5) Hyperlipidemia LDL goal <130  Comment: Historically well controlled. Continue current meds.   Plan: rosuvastatin (CRESTOR) 20 MG tablet, **TSH with        free T4 reflex FUTURE 2mo, Lipid panel reflex         to direct LDL Fasting, **Comprehensive         metabolic panel FUTURE 2mo, OFFICE/OUTPT         VISIT,EST,LEVL III          (Z11.4) Screening for HIV (human immunodeficiency virus)  Plan: HIV Antigen Antibody Combo          (Z11.59) Need for hepatitis C screening test  Plan: Hepatitis C Screen Reflex to HCV RNA Quant and         Genotype          (Z12.5) Screening PSA (prostate specific antigen)  Plan: PSA, screen          (Z79.899) Medication management  Plan: **TSH with free T4 reflex FUTURE 2mo, **CBC         with platelets FUTURE 2mo            Patient has been advised of split billing requirements and indicates understanding: Yes      COUNSELING:   Reviewed preventive health counseling, as reflected in patient instructions        He reports that he has never smoked. He has never used smokeless  "tobacco.          Isaac Mallory MD,   [facility]      Patient Instructions   I believe that the episodic discomfort in the groin is coming from irritation (but not infection) of the \"epididymis\", or the tube that carries sperm from the testicle. Treatment is generally mechanical protection to the area until it improves. No signs of problems on exam today.     Everything looks fine!    Refills of medication have been faxed to your pharmacy.     Lab results will be available soon on [employer].    See me in a year, sooner if problems.      " 2 seconds or less

## 2023-01-24 NOTE — NURSING NOTE
Prior to immunization administration, verified patients identity using patient s name and date of birth. Please see Immunization Activity for additional information.     Screening Questionnaire for Adult Immunization    Are you sick today?   No   Do you have allergies to medications, food, a vaccine component or latex?   No   Have you ever had a serious reaction after receiving a vaccination?   No   Do you have a long-term health problem with heart, lung, kidney, or metabolic disease (e.g., diabetes), asthma, a blood disorder, no spleen, complement component deficiency, a cochlear implant, or a spinal fluid leak?  Are you on long-term aspirin therapy?   No   Do you have cancer, leukemia, HIV/AIDS, or any other immune system problem?   No   Do you have a parent, brother, or sister with an immune system problem?   No   In the past 3 months, have you taken medications that affect  your immune system, such as prednisone, other steroids, or anticancer drugs; drugs for the treatment of rheumatoid arthritis, Crohn s disease, or psoriasis; or have you had radiation treatments?   No   Have you had a seizure, or a brain or other nervous system problem?   No   During the past year, have you received a transfusion of blood or blood    products, or been given immune (gamma) globulin or antiviral drug?   No   For women: Are you pregnant or is there a chance you could become       pregnant during the next month?   No   Have you received any vaccinations in the past 4 weeks?   No     Immunization questionnaire answers were all negative.        Per orders of Dr. Mallory, injection of Tdap given by Sofiya Phillips CMA. Patient instructed to remain in clinic for 15 minutes afterwards, and to report any adverse reaction to me immediately.       Screening performed by Sofiya Phillips CMA on 1/24/2023 at 7:09 AM.

## 2024-01-04 ENCOUNTER — PATIENT OUTREACH (OUTPATIENT)
Dept: CARE COORDINATION | Facility: CLINIC | Age: 59
End: 2024-01-04
Payer: COMMERCIAL

## 2024-01-18 ENCOUNTER — PATIENT OUTREACH (OUTPATIENT)
Dept: CARE COORDINATION | Facility: CLINIC | Age: 59
End: 2024-01-18
Payer: COMMERCIAL

## 2024-03-24 ENCOUNTER — HEALTH MAINTENANCE LETTER (OUTPATIENT)
Age: 59
End: 2024-03-24

## 2024-04-12 DIAGNOSIS — E78.5 HYPERLIPIDEMIA LDL GOAL <130: ICD-10-CM

## 2024-04-12 RX ORDER — ROSUVASTATIN CALCIUM 20 MG/1
20 TABLET, COATED ORAL DAILY
Qty: 60 TABLET | OUTPATIENT
Start: 2024-04-12

## 2024-04-15 ENCOUNTER — MYC REFILL (OUTPATIENT)
Dept: INTERNAL MEDICINE | Facility: CLINIC | Age: 59
End: 2024-04-15
Payer: COMMERCIAL

## 2024-04-15 DIAGNOSIS — E78.5 HYPERLIPIDEMIA LDL GOAL <130: ICD-10-CM

## 2024-04-15 RX ORDER — ROSUVASTATIN CALCIUM 20 MG/1
20 TABLET, COATED ORAL DAILY
Qty: 90 TABLET | Refills: 1 | OUTPATIENT
Start: 2024-04-15

## 2024-04-20 DIAGNOSIS — E78.5 HYPERLIPIDEMIA LDL GOAL <130: ICD-10-CM

## 2024-04-22 RX ORDER — ROSUVASTATIN CALCIUM 20 MG/1
20 TABLET, COATED ORAL DAILY
Qty: 90 TABLET | Refills: 1 | OUTPATIENT
Start: 2024-04-22

## 2024-07-27 SDOH — HEALTH STABILITY: PHYSICAL HEALTH: ON AVERAGE, HOW MANY DAYS PER WEEK DO YOU ENGAGE IN MODERATE TO STRENUOUS EXERCISE (LIKE A BRISK WALK)?: 5 DAYS

## 2024-07-27 SDOH — HEALTH STABILITY: PHYSICAL HEALTH: ON AVERAGE, HOW MANY MINUTES DO YOU ENGAGE IN EXERCISE AT THIS LEVEL?: 70 MIN

## 2024-07-27 ASSESSMENT — SOCIAL DETERMINANTS OF HEALTH (SDOH): HOW OFTEN DO YOU GET TOGETHER WITH FRIENDS OR RELATIVES?: ONCE A WEEK

## 2024-07-30 ENCOUNTER — OFFICE VISIT (OUTPATIENT)
Dept: INTERNAL MEDICINE | Facility: CLINIC | Age: 59
End: 2024-07-30
Payer: COMMERCIAL

## 2024-07-30 VITALS
TEMPERATURE: 97.6 F | SYSTOLIC BLOOD PRESSURE: 133 MMHG | WEIGHT: 196.5 LBS | OXYGEN SATURATION: 98 % | DIASTOLIC BLOOD PRESSURE: 80 MMHG | BODY MASS INDEX: 27.51 KG/M2 | RESPIRATION RATE: 14 BRPM | HEIGHT: 71 IN | HEART RATE: 83 BPM

## 2024-07-30 DIAGNOSIS — Z00.00 ROUTINE GENERAL MEDICAL EXAMINATION AT A HEALTH CARE FACILITY: Primary | ICD-10-CM

## 2024-07-30 DIAGNOSIS — Z12.5 SCREENING PSA (PROSTATE SPECIFIC ANTIGEN): ICD-10-CM

## 2024-07-30 DIAGNOSIS — E78.5 HYPERLIPIDEMIA LDL GOAL <130: ICD-10-CM

## 2024-07-30 DIAGNOSIS — Z79.899 MEDICATION MANAGEMENT: ICD-10-CM

## 2024-07-30 LAB
ALBUMIN SERPL BCG-MCNC: 4.5 G/DL (ref 3.5–5.2)
ALP SERPL-CCNC: 77 U/L (ref 40–150)
ALT SERPL W P-5'-P-CCNC: 25 U/L (ref 0–70)
ANION GAP SERPL CALCULATED.3IONS-SCNC: 9 MMOL/L (ref 7–15)
AST SERPL W P-5'-P-CCNC: 27 U/L (ref 0–45)
BILIRUB SERPL-MCNC: 1.3 MG/DL
BUN SERPL-MCNC: 14.9 MG/DL (ref 6–20)
CALCIUM SERPL-MCNC: 9.4 MG/DL (ref 8.8–10.4)
CHLORIDE SERPL-SCNC: 105 MMOL/L (ref 98–107)
CHOLEST SERPL-MCNC: 132 MG/DL
CREAT SERPL-MCNC: 1.13 MG/DL (ref 0.67–1.17)
EGFRCR SERPLBLD CKD-EPI 2021: 75 ML/MIN/1.73M2
ERYTHROCYTE [DISTWIDTH] IN BLOOD BY AUTOMATED COUNT: 12.4 % (ref 10–15)
FASTING STATUS PATIENT QL REPORTED: YES
FASTING STATUS PATIENT QL REPORTED: YES
GLUCOSE SERPL-MCNC: 92 MG/DL (ref 70–99)
HCO3 SERPL-SCNC: 28 MMOL/L (ref 22–29)
HCT VFR BLD AUTO: 43.4 % (ref 40–53)
HDLC SERPL-MCNC: 51 MG/DL
HGB BLD-MCNC: 14.9 G/DL (ref 13.3–17.7)
LDLC SERPL CALC-MCNC: 67 MG/DL
MCH RBC QN AUTO: 29.2 PG (ref 26.5–33)
MCHC RBC AUTO-ENTMCNC: 34.3 G/DL (ref 31.5–36.5)
MCV RBC AUTO: 85 FL (ref 78–100)
NONHDLC SERPL-MCNC: 81 MG/DL
PLATELET # BLD AUTO: 210 10E3/UL (ref 150–450)
POTASSIUM SERPL-SCNC: 4.6 MMOL/L (ref 3.4–5.3)
PROT SERPL-MCNC: 7.3 G/DL (ref 6.4–8.3)
PSA SERPL DL<=0.01 NG/ML-MCNC: 1.21 NG/ML (ref 0–3.5)
RBC # BLD AUTO: 5.11 10E6/UL (ref 4.4–5.9)
SODIUM SERPL-SCNC: 142 MMOL/L (ref 135–145)
TRIGL SERPL-MCNC: 69 MG/DL
TSH SERPL DL<=0.005 MIU/L-ACNC: 2.03 UIU/ML (ref 0.3–4.2)
WBC # BLD AUTO: 6.8 10E3/UL (ref 4–11)

## 2024-07-30 PROCEDURE — 80053 COMPREHEN METABOLIC PANEL: CPT | Performed by: INTERNAL MEDICINE

## 2024-07-30 PROCEDURE — 85027 COMPLETE CBC AUTOMATED: CPT | Performed by: INTERNAL MEDICINE

## 2024-07-30 PROCEDURE — 99213 OFFICE O/P EST LOW 20 MIN: CPT | Mod: 25 | Performed by: INTERNAL MEDICINE

## 2024-07-30 PROCEDURE — 80061 LIPID PANEL: CPT | Performed by: INTERNAL MEDICINE

## 2024-07-30 PROCEDURE — G0103 PSA SCREENING: HCPCS | Performed by: INTERNAL MEDICINE

## 2024-07-30 PROCEDURE — 99396 PREV VISIT EST AGE 40-64: CPT | Performed by: INTERNAL MEDICINE

## 2024-07-30 PROCEDURE — 36415 COLL VENOUS BLD VENIPUNCTURE: CPT | Performed by: INTERNAL MEDICINE

## 2024-07-30 PROCEDURE — 84443 ASSAY THYROID STIM HORMONE: CPT | Performed by: INTERNAL MEDICINE

## 2024-07-30 RX ORDER — ROSUVASTATIN CALCIUM 20 MG/1
20 TABLET, COATED ORAL DAILY
Qty: 90 TABLET | Refills: 3 | Status: SHIPPED | OUTPATIENT
Start: 2024-07-30

## 2024-07-30 ASSESSMENT — LIFESTYLE VARIABLES
HOW OFTEN DO YOU HAVE A DRINK CONTAINING ALCOHOL: 2-4 TIMES A MONTH
HOW OFTEN DO YOU HAVE SIX OR MORE DRINKS ON ONE OCCASION: NEVER
HOW MANY STANDARD DRINKS CONTAINING ALCOHOL DO YOU HAVE ON A TYPICAL DAY: 1 OR 2
AUDIT-C TOTAL SCORE: 2
SKIP TO QUESTIONS 9-10: 1

## 2024-07-30 ASSESSMENT — PAIN SCALES - GENERAL: PAINLEVEL: NO PAIN (0)

## 2024-07-30 NOTE — PATIENT INSTRUCTIONS
Patient Education   Preventive Care Advice   This is general advice given by our system to help you stay healthy. However, your care team may have specific advice just for you. Please talk to your care team about your preventive care needs.  Nutrition  Eat 5 or more servings of fruits and vegetables each day.  Try wheat bread, brown rice and whole grain pasta (instead of white bread, rice, and pasta).  Get enough calcium and vitamin D. Check the label on foods and aim for 100% of the RDA (recommended daily allowance).  Lifestyle  Exercise at least 150 minutes each week  (30 minutes a day, 5 days a week).  Do muscle strengthening activities 2 days a week. These help control your weight and prevent disease.  No smoking.  Wear sunscreen to prevent skin cancer.  Have a dental exam and cleaning every 6 months.  Yearly exams  See your health care team every year to talk about:  Any changes in your health.  Any medicines your care team has prescribed.  Preventive care, family planning, and ways to prevent chronic diseases.  Shots (vaccines)   HPV shots (up to age 26), if you've never had them before.  Hepatitis B shots (up to age 59), if you've never had them before.  COVID-19 shot: Get this shot when it's due.  Flu shot: Get a flu shot every year.  Tetanus shot: Get a tetanus shot every 10 years.  Pneumococcal, hepatitis A, and RSV shots: Ask your care team if you need these based on your risk.  Shingles shot (for age 50 and up)  General health tests  Diabetes screening:  Starting at age 35, Get screened for diabetes at least every 3 years.  If you are younger than age 35, ask your care team if you should be screened for diabetes.  Cholesterol test: At age 39, start having a cholesterol test every 5 years, or more often if advised.  Bone density scan (DEXA): At age 50, ask your care team if you should have this scan for osteoporosis (brittle bones).  Hepatitis C: Get tested at least once in your life.  STIs (sexually  transmitted infections)  Before age 24: Ask your care team if you should be screened for STIs.  After age 24: Get screened for STIs if you're at risk. You are at risk for STIs (including HIV) if:  You are sexually active with more than one person.  You don't use condoms every time.  You or a partner was diagnosed with a sexually transmitted infection.  If you are at risk for HIV, ask about PrEP medicine to prevent HIV.  Get tested for HIV at least once in your life, whether you are at risk for HIV or not.  Cancer screening tests  Cervical cancer screening: If you have a cervix, begin getting regular cervical cancer screening tests starting at age 21.  Breast cancer scan (mammogram): If you've ever had breasts, begin having regular mammograms starting at age 40. This is a scan to check for breast cancer.  Colon cancer screening: It is important to start screening for colon cancer at age 45.  Have a colonoscopy test every 10 years (or more often if you're at risk) Or, ask your provider about stool tests like a FIT test every year or Cologuard test every 3 years.  To learn more about your testing options, visit:   .  For help making a decision, visit:   https://bit.ly/lf53362.  Prostate cancer screening test: If you have a prostate, ask your care team if a prostate cancer screening test (PSA) at age 55 is right for you.  Lung cancer screening: If you are a current or former smoker ages 50 to 80, ask your care team if ongoing lung cancer screenings are right for you.  For informational purposes only. Not to replace the advice of your health care provider. Copyright   2023 [facility]. All rights reserved. Clinically reviewed by the [facility]. [employer] 881751 - REV 01/24.       Patient Instructions   Consider getting Shingles (Shingrix), and Covid-19 booster immunizations at your pharmacy.     Everything looks fine.    Refills of medication have been faxed to your pharmacy.      Lab results will be available soon on [employer].    See me in a year, sooner if problems.

## 2024-07-30 NOTE — PROGRESS NOTES
"Preventive Care Visit  [facility]  Isaac Mallory MD, Internal Medicine  Jul 30, 2024      Assessment & Plan   (Z00.00) Routine general medical examination at a health care facility  (primary encounter diagnosis)  Comment: Stable health. See epic orders.   Plan: CBC with platelets, Comprehensive metabolic         panel, Lipid panel reflex to direct LDL         Fasting, TSH with free T4 reflex, PSA, screen          (E78.5) Hyperlipidemia LDL goal <130  Comment: LDL at target. Continue current meds.   Plan: Comprehensive metabolic panel, Lipid panel         reflex to direct LDL Fasting, OFFICE/OUTPT         VISIT,EST,LEVL III, rosuvastatin (CRESTOR) 20         MG tablet          (Z12.5) Screening PSA (prostate specific antigen)  Plan: PSA, screen          (Z79.899) Medication management  Plan: CBC with platelets, TSH with free T4 reflex              Patient has been advised of split billing requirements and indicates understanding: Yes        BMI  Estimated body mass index is 27.41 kg/m  as calculated from the following:    Height as of this encounter: 1.803 m (5' 11\").    Weight as of this encounter: 89.1 kg (196 lb 8 oz).   Weight management plan: Discussed healthy diet and exercise guidelines    Counseling  Appropriate preventive services were addressed with this patient via screening, questionnaire, or discussion as appropriate for fall prevention, nutrition, physical activity, Tobacco-use cessation, weight loss and cognition.  Checklist reviewing preventive services available has been given to the patient.  Reviewed patient's diet, addressing concerns and/or questions.     Patient Instructions   Consider getting Shingles (Shingrix), and Covid-19 booster immunizations at your pharmacy.     Everything looks fine.    Refills of medication have been faxed to your pharmacy.     Lab results will be available soon on [email or web address].    See me in a year, sooner if problems.           Subjective   Papo " is a 58 year old, presenting for the following:  Physical        7/30/2024     6:55 AM   Additional Questions   Roomed by Melinad LIM        Health Care Directive  Patient does not have a Health Care Directive or Living Will: Discussed advance care planning with patient; however, patient declined at this time.    HPI  In addition to a preventive exam, we addressed hyperlipidemia.      The patient is tolerating his current medications without any adverse effects.   LDL-Cholesterol historically well controlled on rosuvastatin. We agreed to update lipids along with other labs today.             7/27/2024   General Health   How would you rate your overall physical health? Excellent   Feel stress (tense, anxious, or unable to sleep) Not at all            7/27/2024   Nutrition   Three or more servings of calcium each day? Yes   Diet: Regular (no restrictions)   How many servings of fruit and vegetables per day? (!) 2-3   How many sweetened beverages each day? 0-1            7/27/2024   Exercise   Days per week of moderate/strenous exercise 5 days   Average minutes spent exercising at this level 70 min            7/27/2024   Social Factors   Frequency of gathering with friends or relatives Once a week   Worry food won't last until get money to buy more No   Food not last or not have enough money for food? No   Do you have housing? (Housing is defined as stable permanent housing and does not include staying ouside in a car, in a tent, in an abandoned building, in an overnight shelter, or couch-surfing.) Yes   Are you worried about losing your housing? No   Lack of transportation? No   Unable to get utilities (heat,electricity)? No            7/27/2024   Fall Risk   Fallen 2 or more times in the past year? No   Trouble with walking or balance? No             7/27/2024   Dental   Dentist two times every year? Yes            7/27/2024   TB Screening   Were you born outside of the [place]? No            Today's PHQ-2 Score:        7/29/2024    11:10 AM   PHQ-2 ( 1999 Pfizer)   Q1: Little interest or pleasure in doing things 0   Q2: Feeling down, depressed or hopeless 0   PHQ-2 Score 0   Q1: Little interest or pleasure in doing things Not at all   Q2: Feeling down, depressed or hopeless Not at all   PHQ-2 Score 0           7/27/2024   Substance Use   Alcohol more than 3/day or more than 7/wk No   Do you use any other substances recreationally? No        Social History     Tobacco Use    Smoking status: Never    Smokeless tobacco: Never   Vaping Use    Vaping status: Never Used   Substance Use Topics    Alcohol use: Yes     Comment: 1 to 2 glasses of wine per month    Drug use: No           7/27/2024   STI Screening   New sexual partner(s) since last STI/HIV test? No      Last PSA:   PSA   Date Value Ref Range Status   01/15/2020 0.77 0 - 4 ug/L Final     Comment:     Assay Method:  Chemiluminescence using Siemens Vista analyzer     Prostate Specific Antigen Screen   Date Value Ref Range Status   01/24/2023 1.00 0.00 - 3.50 ng/mL Final   09/07/2021 0.92 0.00 - 4.00 ug/L Final     ASCVD Risk   The 10-year ASCVD risk score (Araseli ZUNIGA, et al., 2019) is: 5.8%    Values used to calculate the score:      Age: 58 years      Sex: Male      Is Non- : No      Diabetic: No      Tobacco smoker: No      Systolic Blood Pressure: 133 mmHg      Is BP treated: No      HDL Cholesterol: 47 mg/dL      Total Cholesterol: 141 mg/dL         Reviewed and updated as needed this visit by Provider                    Past medical, family and social histories as well as medications reviewed and updated as needed.    REVIEW OF SYSTEMS: The following systems have been completely reviewed and are negative except as noted above:   Constitutional, HEENT, respiratory, cardiovascular, gastrointestinal, genitourinary, musculoskeletal, dermatologic, hematologic, endocrine, psychiatric, and neurologic systems.       Objective    Exam  /80 (BP  "Location: Left arm, Cuff Size: Adult Regular)   Pulse 83   Temp 97.6  F (36.4  C) (Tympanic)   Resp 14   Ht 1.803 m (5' 11\")   Wt 89.1 kg (196 lb 8 oz)   SpO2 98%   BMI 27.41 kg/m     Estimated body mass index is 27.41 kg/m  as calculated from the following:    Height as of this encounter: 1.803 m (5' 11\").    Weight as of this encounter: 89.1 kg (196 lb 8 oz).  Waist circumference:  38in    Physical Exam  GENERAL: alert and no distress  EYES: Eyes grossly normal to inspection, PERRL and conjunctivae and sclerae normal  HENT: ear canals and TM's normal, nose and mouth without ulcers or lesions  NECK: no adenopathy, no asymmetry, masses, or scars  RESP: lungs clear to auscultation - no rales, rhonchi or wheezes  CV: regular rate and rhythm, normal S1 S2, no S3 or S4, no murmur, click or rub, no peripheral edema  ABDOMEN: soft, nontender, no hepatosplenomegaly, no masses and bowel sounds normal  MS: no gross musculoskeletal defects noted, no edema  SKIN: no suspicious lesions or rashes  NEURO: Normal strength and tone, mentation intact and speech normal  PSYCH: mentation appears normal, affect normal/bright        Signed Electronically by: Isaac Mallory MD    "

## 2024-08-19 ENCOUNTER — TRANSFERRED RECORDS (OUTPATIENT)
Dept: HEALTH INFORMATION MANAGEMENT | Facility: CLINIC | Age: 59
End: 2024-08-19
Payer: COMMERCIAL

## 2024-08-20 ENCOUNTER — TELEPHONE (OUTPATIENT)
Dept: INTERNAL MEDICINE | Facility: CLINIC | Age: 59
End: 2024-08-20
Payer: COMMERCIAL

## 2024-08-20 NOTE — TELEPHONE ENCOUNTER
[employer] screening form presented and requesting Md signature.       Forms in [facility] mail box for review and signature.

## 2024-11-06 ENCOUNTER — TRANSFERRED RECORDS (OUTPATIENT)
Dept: HEALTH INFORMATION MANAGEMENT | Facility: CLINIC | Age: 59
End: 2024-11-06
Payer: COMMERCIAL

## 2025-05-13 ENCOUNTER — TRANSFERRED RECORDS (OUTPATIENT)
Dept: HEALTH INFORMATION MANAGEMENT | Facility: CLINIC | Age: 60
End: 2025-05-13
Payer: COMMERCIAL

## 2025-08-07 SDOH — HEALTH STABILITY: PHYSICAL HEALTH: ON AVERAGE, HOW MANY MINUTES DO YOU ENGAGE IN EXERCISE AT THIS LEVEL?: 30 MIN

## 2025-08-07 SDOH — HEALTH STABILITY: PHYSICAL HEALTH: ON AVERAGE, HOW MANY DAYS PER WEEK DO YOU ENGAGE IN MODERATE TO STRENUOUS EXERCISE (LIKE A BRISK WALK)?: 5 DAYS

## 2025-08-07 ASSESSMENT — SOCIAL DETERMINANTS OF HEALTH (SDOH): HOW OFTEN DO YOU GET TOGETHER WITH FRIENDS OR RELATIVES?: ONCE A WEEK

## 2025-08-12 ENCOUNTER — OFFICE VISIT (OUTPATIENT)
Dept: INTERNAL MEDICINE | Facility: CLINIC | Age: 60
End: 2025-08-12
Attending: INTERNAL MEDICINE
Payer: COMMERCIAL

## 2025-08-12 VITALS
OXYGEN SATURATION: 100 % | BODY MASS INDEX: 26.12 KG/M2 | RESPIRATION RATE: 16 BRPM | SYSTOLIC BLOOD PRESSURE: 125 MMHG | HEIGHT: 71 IN | HEART RATE: 75 BPM | DIASTOLIC BLOOD PRESSURE: 78 MMHG | WEIGHT: 186.6 LBS | TEMPERATURE: 96.8 F

## 2025-08-12 DIAGNOSIS — E78.5 HYPERLIPIDEMIA LDL GOAL <130: ICD-10-CM

## 2025-08-12 DIAGNOSIS — Z00.00 ROUTINE GENERAL MEDICAL EXAMINATION AT A HEALTH CARE FACILITY: Primary | ICD-10-CM

## 2025-08-12 DIAGNOSIS — Z79.899 MEDICATION MANAGEMENT: ICD-10-CM

## 2025-08-12 DIAGNOSIS — Z12.5 SCREENING PSA (PROSTATE SPECIFIC ANTIGEN): ICD-10-CM

## 2025-08-12 DIAGNOSIS — Z12.11 SPECIAL SCREENING FOR MALIGNANT NEOPLASMS, COLON: ICD-10-CM

## 2025-08-12 DIAGNOSIS — Z13.6 SCREENING FOR CARDIOVASCULAR CONDITION: ICD-10-CM

## 2025-08-12 DIAGNOSIS — M16.12 PRIMARY OSTEOARTHRITIS OF LEFT HIP: ICD-10-CM

## 2025-08-12 LAB
ALBUMIN SERPL BCG-MCNC: 4.4 G/DL (ref 3.5–5.2)
ALP SERPL-CCNC: 73 U/L (ref 40–150)
ALT SERPL W P-5'-P-CCNC: 29 U/L (ref 0–70)
ANION GAP SERPL CALCULATED.3IONS-SCNC: 10 MMOL/L (ref 7–15)
AST SERPL W P-5'-P-CCNC: 26 U/L (ref 0–45)
BILIRUB SERPL-MCNC: 1.1 MG/DL
BUN SERPL-MCNC: 19.2 MG/DL (ref 8–23)
CALCIUM SERPL-MCNC: 9.8 MG/DL (ref 8.8–10.4)
CHLORIDE SERPL-SCNC: 104 MMOL/L (ref 98–107)
CHOLEST SERPL-MCNC: 139 MG/DL
CREAT SERPL-MCNC: 1.15 MG/DL (ref 0.67–1.17)
EGFRCR SERPLBLD CKD-EPI 2021: 73 ML/MIN/1.73M2
ERYTHROCYTE [DISTWIDTH] IN BLOOD BY AUTOMATED COUNT: 13 % (ref 10–15)
FASTING STATUS PATIENT QL REPORTED: YES
FASTING STATUS PATIENT QL REPORTED: YES
GLUCOSE SERPL-MCNC: 93 MG/DL (ref 70–99)
HCO3 SERPL-SCNC: 28 MMOL/L (ref 22–29)
HCT VFR BLD AUTO: 43.6 % (ref 40–53)
HDLC SERPL-MCNC: 50 MG/DL
HGB BLD-MCNC: 14.9 G/DL (ref 13.3–17.7)
LDLC SERPL CALC-MCNC: 76 MG/DL
MCH RBC QN AUTO: 29 PG (ref 26.5–33)
MCHC RBC AUTO-ENTMCNC: 34.2 G/DL (ref 31.5–36.5)
MCV RBC AUTO: 85 FL (ref 78–100)
NONHDLC SERPL-MCNC: 89 MG/DL
PLATELET # BLD AUTO: 183 10E3/UL (ref 150–450)
POTASSIUM SERPL-SCNC: 4.5 MMOL/L (ref 3.4–5.3)
PROT SERPL-MCNC: 7.5 G/DL (ref 6.4–8.3)
PSA SERPL DL<=0.01 NG/ML-MCNC: 1.24 NG/ML (ref 0–3.5)
RBC # BLD AUTO: 5.13 10E6/UL (ref 4.4–5.9)
SODIUM SERPL-SCNC: 142 MMOL/L (ref 135–145)
TRIGL SERPL-MCNC: 64 MG/DL
TSH SERPL DL<=0.005 MIU/L-ACNC: 2.21 UIU/ML (ref 0.3–4.2)
WBC # BLD AUTO: 5.7 10E3/UL (ref 4–11)

## 2025-08-12 PROCEDURE — 36415 COLL VENOUS BLD VENIPUNCTURE: CPT | Performed by: INTERNAL MEDICINE

## 2025-08-12 PROCEDURE — 99396 PREV VISIT EST AGE 40-64: CPT | Performed by: INTERNAL MEDICINE

## 2025-08-12 PROCEDURE — 85027 COMPLETE CBC AUTOMATED: CPT | Performed by: INTERNAL MEDICINE

## 2025-08-12 PROCEDURE — 80053 COMPREHEN METABOLIC PANEL: CPT | Performed by: INTERNAL MEDICINE

## 2025-08-12 PROCEDURE — 3048F LDL-C <100 MG/DL: CPT | Performed by: INTERNAL MEDICINE

## 2025-08-12 PROCEDURE — 1126F AMNT PAIN NOTED NONE PRSNT: CPT | Performed by: INTERNAL MEDICINE

## 2025-08-12 PROCEDURE — 99214 OFFICE O/P EST MOD 30 MIN: CPT | Mod: 25 | Performed by: INTERNAL MEDICINE

## 2025-08-12 PROCEDURE — 84443 ASSAY THYROID STIM HORMONE: CPT | Performed by: INTERNAL MEDICINE

## 2025-08-12 PROCEDURE — 80061 LIPID PANEL: CPT | Performed by: INTERNAL MEDICINE

## 2025-08-12 PROCEDURE — 3074F SYST BP LT 130 MM HG: CPT | Performed by: INTERNAL MEDICINE

## 2025-08-12 PROCEDURE — G0103 PSA SCREENING: HCPCS | Performed by: INTERNAL MEDICINE

## 2025-08-12 PROCEDURE — G2211 COMPLEX E/M VISIT ADD ON: HCPCS | Performed by: INTERNAL MEDICINE

## 2025-08-12 PROCEDURE — 3078F DIAST BP <80 MM HG: CPT | Performed by: INTERNAL MEDICINE

## 2025-08-12 RX ORDER — ROSUVASTATIN CALCIUM 20 MG/1
20 TABLET, COATED ORAL DAILY
Qty: 90 TABLET | Refills: 3 | Status: SHIPPED | OUTPATIENT
Start: 2025-08-12

## 2025-08-12 ASSESSMENT — PAIN SCALES - GENERAL: PAINLEVEL_OUTOF10: NO PAIN (0)

## 2025-08-26 ENCOUNTER — TELEPHONE (OUTPATIENT)
Dept: GASTROENTEROLOGY | Facility: CLINIC | Age: 60
End: 2025-08-26
Payer: COMMERCIAL

## (undated) RX ORDER — NITROGLYCERIN 0.4 MG/1
TABLET SUBLINGUAL
Status: DISPENSED
Start: 2019-07-25

## (undated) RX ORDER — METOPROLOL TARTRATE 1 MG/ML
INJECTION, SOLUTION INTRAVENOUS
Status: DISPENSED
Start: 2019-07-25